# Patient Record
Sex: FEMALE | Race: BLACK OR AFRICAN AMERICAN | NOT HISPANIC OR LATINO | Employment: FULL TIME | URBAN - METROPOLITAN AREA
[De-identification: names, ages, dates, MRNs, and addresses within clinical notes are randomized per-mention and may not be internally consistent; named-entity substitution may affect disease eponyms.]

---

## 2023-05-22 LAB
HEPATITIS B VIRUS SURFACE AG PRESENCE IN SERUM: NONREACTIVE
HEPATITIS C VIRUS AB PRESENCE IN SERUM: NONREACTIVE
HIV 1/ 2 AG/AB SCREEN: NONREACTIVE
SYPHILIS TOTAL AB: NONREACTIVE

## 2023-05-24 LAB
CHLAMYDIA TRACH., AMPLIFIED: NEGATIVE
N. GONORRHEA, AMPLIFIED: NEGATIVE
TRICHOMONAS VAGINALIS: NEGATIVE

## 2024-01-16 ENCOUNTER — APPOINTMENT (OUTPATIENT)
Dept: RADIOLOGY | Facility: HOSPITAL | Age: 39
End: 2024-01-16
Payer: MEDICAID

## 2024-01-16 ENCOUNTER — HOSPITAL ENCOUNTER (EMERGENCY)
Facility: HOSPITAL | Age: 39
Discharge: HOME | End: 2024-01-16
Attending: EMERGENCY MEDICINE
Payer: MEDICAID

## 2024-01-16 VITALS
HEART RATE: 61 BPM | RESPIRATION RATE: 18 BRPM | OXYGEN SATURATION: 99 % | WEIGHT: 180 LBS | TEMPERATURE: 98.8 F | SYSTOLIC BLOOD PRESSURE: 166 MMHG | HEIGHT: 62 IN | DIASTOLIC BLOOD PRESSURE: 79 MMHG | BODY MASS INDEX: 33.13 KG/M2

## 2024-01-16 DIAGNOSIS — M54.50 ACUTE LOW BACK PAIN, UNSPECIFIED BACK PAIN LATERALITY, UNSPECIFIED WHETHER SCIATICA PRESENT: ICD-10-CM

## 2024-01-16 DIAGNOSIS — M47.816 FACET ARTHROPATHY, LUMBAR: ICD-10-CM
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
## 2024-01-16 LAB — HCG UR QL IA.RAPID: NEGATIVE

## 2024-01-16 PROCEDURE — 72100 X-RAY EXAM L-S SPINE 2/3 VWS: CPT

## 2024-01-16 PROCEDURE — 99283 EMERGENCY DEPT VISIT LOW MDM: CPT | Performed by: EMERGENCY MEDICINE

## 2024-01-16 PROCEDURE — 81025 URINE PREGNANCY TEST: CPT | Performed by: PHYSICIAN ASSISTANT

## 2024-01-16 PROCEDURE — 2500000001 HC RX 250 WO HCPCS SELF ADMINISTERED DRUGS (ALT 637 FOR MEDICARE OP): Performed by: EMERGENCY MEDICINE

## 2024-01-16 PROCEDURE — 2500000005 HC RX 250 GENERAL PHARMACY W/O HCPCS: Performed by: EMERGENCY MEDICINE

## 2024-01-16 RX ORDER — LIDOCAINE 560 MG/1
1 PATCH PERCUTANEOUS; TOPICAL; TRANSDERMAL ONCE
Status: DISCONTINUED | OUTPATIENT
Start: 2024-01-16 | End: 2024-01-16 | Stop reason: HOSPADM

## 2024-01-16 RX ORDER — METHYLPREDNISOLONE 4 MG/1
TABLET ORAL
Qty: 21 TABLET | Refills: 0 | Status: SHIPPED | OUTPATIENT
Start: 2024-01-16 | End: 2024-01-23

## 2024-01-16 RX ORDER — ACETAMINOPHEN 325 MG/1
650 TABLET ORAL EVERY 6 HOURS PRN
Qty: 30 TABLET | Refills: 0 | Status: SHIPPED | OUTPATIENT
Start: 2024-01-16 | End: 2024-01-26

## 2024-01-16 RX ORDER — ACETAMINOPHEN 325 MG/1
650 TABLET ORAL ONCE
Status: COMPLETED | OUTPATIENT
Start: 2024-01-16 | End: 2024-01-16

## 2024-01-16 RX ORDER — METHOCARBAMOL 500 MG/1
500 TABLET, FILM COATED ORAL 2 TIMES DAILY
Qty: 20 TABLET | Refills: 0 | Status: SHIPPED | OUTPATIENT
Start: 2024-01-16 | End: 2024-01-26

## 2024-01-16 RX ORDER — IBUPROFEN 600 MG/1
600 TABLET ORAL ONCE
Status: COMPLETED | OUTPATIENT
Start: 2024-01-16 | End: 2024-01-16

## 2024-01-16 RX ORDER — LIDOCAINE 50 MG/G
1 PATCH TOPICAL DAILY
Qty: 10 PATCH | Refills: 0 | Status: SHIPPED | OUTPATIENT
Start: 2024-01-16

## 2024-01-16 RX ADMIN — ACETAMINOPHEN 650 MG: 325 TABLET ORAL at 11:41

## 2024-01-16 RX ADMIN — LIDOCAINE 1 PATCH: 4 PATCH TOPICAL at 11:40

## 2024-01-16 RX ADMIN — IBUPROFEN 600 MG: 600 TABLET, FILM COATED ORAL at 11:41

## 2024-01-16 ASSESSMENT — PAIN DESCRIPTION - ORIENTATION
ORIENTATION: LOWER
ORIENTATION: MID;LOWER

## 2024-01-16 ASSESSMENT — PAIN - FUNCTIONAL ASSESSMENT
PAIN_FUNCTIONAL_ASSESSMENT: 0-10
PAIN_FUNCTIONAL_ASSESSMENT: 0-10

## 2024-01-16 ASSESSMENT — PAIN DESCRIPTION - LOCATION
LOCATION: BACK
LOCATION: BACK

## 2024-01-16 ASSESSMENT — PAIN SCALES - GENERAL
PAINLEVEL_OUTOF10: 5 - MODERATE PAIN
PAINLEVEL_OUTOF10: 8

## 2024-01-16 ASSESSMENT — COLUMBIA-SUICIDE SEVERITY RATING SCALE - C-SSRS
1. IN THE PAST MONTH, HAVE YOU WISHED YOU WERE DEAD OR WISHED YOU COULD GO TO SLEEP AND NOT WAKE UP?: NO
6. HAVE YOU EVER DONE ANYTHING, STARTED TO DO ANYTHING, OR PREPARED TO DO ANYTHING TO END YOUR LIFE?: NO
2. HAVE YOU ACTUALLY HAD ANY THOUGHTS OF KILLING YOURSELF?: NO

## 2024-01-16 ASSESSMENT — PAIN DESCRIPTION - PAIN TYPE: TYPE: ACUTE PAIN

## 2024-01-16 NOTE — PROGRESS NOTES
Attestation note/supervisory note for FRANCISCO Christopher      The patient is a 38-year-old female presenting to the emergency department for evaluation of low back pain that started after she fell 5 days ago.  She states that she was walking at work and slipped and fell backwards.  She states that she did injure her lower back.  She states that she has had some pain since the injury but it worsened today when she was driving to work.  No bowel or bladder incontinence.  No urinary retention.  No fever or chills.  No history of IV drug abuse.  No history of malignancy.  She states that she does not have any headache or visual changes.  No neck pain.  She does have lower back pain.  No specific better or worse other than when she moves.  No radiation.  It is a constant aching pain.  She denies any weakness or numbness.  No chest pain or shortness of breath.  No abdominal pain.  No nausea vomiting.  No diarrhea or constipation.  No urinary complaints.  The patient states that she does not want to file this is a Worker's Compensation claim.  All pertinent positives and negatives are recorded above.  All other systems reviewed and otherwise negative.  Vital signs with hypertension but otherwise within normal limits.  Physical exam with a well-nourished well-developed female in no acute distress.  HEENT exam within normal limits.  She has no evidence of airway compromise or respiratory distress.  Abdominal exam is benign.  She does have pain with palpation of the lumbar spine.  No step-offs.  She has no gross motor, neurologic or vascular deficits on exam.  Strength is 5 of 5 in all 4 strategies.  Sensation is intact.  Reflexes are intact.  She is able to walk and stand without difficulty.      Oral ibuprofen, Lidoderm and oral acetaminophen ordered.        Urine hCG negative      XR lumbar spine 2-3 views   Final Result   No acute osseous abnormality.   Mild degenerative changes with disc space narrowing of facet    osteoarthropathy at L5-S1.        MACRO:   None.        Signed by: Bessie Casey 1/16/2024 11:08 AM   Dictation workstation:   [id number]             The patient does not have any visible or palpable bony deformities on exam.  She does not have any neurologic deficits on exam.  Diagnostic imaging without fracture or dislocation.      The patient was released in good condition.  She was instructed to follow-up with her primary care physician within 1 to 2 days for further management of her current symptoms and repeat check of her blood pressure.  She will return to the emergency department sooner with worsening of symptoms or onset of new symptoms.  Rx given for naproxen and Robaxin.      Impression/diagnoses  Fall from standing height, initial encounter  Low back strain, acute   Hypertension, unspecified    I personally saw the patient and performed a substantive portion of the visit including all aspects of the medical decision making.  I personally saw the patient and made/approve the management plan and take responsibility for the patient.      I reviewed the results of the diagnostic labs and diagnostic imaging.  Formal radiology reading was completed by the radiologist      MD Katie Whitmore MD

## 2024-01-16 NOTE — Clinical Note
Kylee Harry was seen and treated in our emergency department on 1/16/2024.  She may return to work on 01/18/2024.       If you have any questions or concerns, please don't hesitate to call.      Katie Mortensen MD

## 2024-01-16 NOTE — ED PROVIDER NOTES
HPI   Chief Complaint   Patient presents with   • Fall       38-year-old female presented emergency department with a chief complaint of low back pain.  She had a fall approximately 5 days ago.  She has a history of prior back pain.  She denies bowel or bladder dysfunction, saddle anesthesia.  Ambulatory.  Not really been taking anything for relief.  Denies lightheadedness dizziness numbness weakness.  No other complaint.                            Meg Coma Scale Score: 15                  Patient History   Past Medical History:   Diagnosis Date   • Other specified health status     No pertinent past medical history     Past Surgical History:   Procedure Laterality Date   • OTHER SURGICAL HISTORY  2022     section   • OTHER SURGICAL HISTORY  2022    Breast reduction     No family history on file.  Social History     Tobacco Use   • Smoking status: Not on file   • Smokeless tobacco: Not on file   Substance Use Topics   • Alcohol use: Not on file   • Drug use: Not on file       Physical Exam   ED Triage Vitals [24 0819]   Temp Heart Rate Resp BP   36.2 °C (97.2 °F) 70 17 (!) 176/115      SpO2 Temp Source Heart Rate Source Patient Position   98 % Temporal -- --      BP Location FiO2 (%)     -- --       Physical Exam  Vitals and nursing note reviewed.   Constitutional:       Appearance: Normal appearance.   HENT:      Head: Normocephalic and atraumatic.      Nose: Nose normal.      Mouth/Throat:      Mouth: Mucous membranes are moist.   Cardiovascular:      Rate and Rhythm: Normal rate and regular rhythm.   Pulmonary:      Effort: Pulmonary effort is normal.      Breath sounds: Normal breath sounds.   Abdominal:      General: Abdomen is flat.   Musculoskeletal:         General: Normal range of motion.      Cervical back: Normal range of motion.      Comments: Paralumbar tenderness, no midline spinal tenderness, full range of motion of lower extremities bilaterally   Skin:     General: Skin  is warm and dry.   Neurological:      General: No focal deficit present.      Mental Status: She is alert and oriented to person, place, and time.   Psychiatric:         Mood and Affect: Mood normal.         ED Course & MDM   Diagnoses as of 01/16/24 1119   Fall, initial encounter   Acute low back pain, unspecified back pain laterality, unspecified whether sciatica present   Facet arthropathy, lumbar       Medical Decision Making  I have seen and evaluated this patient.  The attending physician has also seen and evaluated this patient.  Vital signs, laboratory testing and diagnostic images if applicable have been reviewed.  All laboratory and imaging is interpreted by myself unless otherwise stated.  Radiology studies are also formally interpreted by radiologist.    X-ray without acute fracture dislocation.  Does demonstrate degenerative changes.  Patient placed on anti-inflammatory medicine, steroid, muscle relaxer.  Released in good condition with primary follow-up.      Labs Reviewed   HCG, URINE, QUALITATIVE - Normal       Result Value    HCG, Urine NEGATIVE     POCT PREGNANCY, URINE     XR lumbar spine 2-3 views   Final Result   No acute osseous abnormality.   Mild degenerative changes with disc space narrowing of facet   osteoarthropathy at L5-S1.        MACRO:   None.        Signed by: Bessie Casey 1/16/2024 11:08 AM   Dictation workstation:   [id number]        Medications   acetaminophen (Tylenol) tablet 650 mg (has no administration in time range)   ibuprofen tablet 600 mg (has no administration in time range)   lidocaine 4 % patch 1 patch (has no administration in time range)     New Prescriptions    ACETAMINOPHEN (TYLENOL) 325 MG TABLET    Take 2 tablets (650 mg) by mouth every 6 hours if needed for mild pain (1 - 3) for up to 10 days.    LIDOCAINE (LIDODERM) 5 % PATCH    Place 1 patch over 12 hours on the skin once daily. Remove & discard patch within 12 hours or as directed by MD.    METHOCARBAMOL  (ROBAXIN) 500 MG TABLET    Take 1 tablet (500 mg) by mouth 2 times a day for 10 days.    METHYLPREDNISOLONE (MEDROL DOSPAK) 4 MG TABLETS    Follow schedule on package instructions                         Procedure  Procedures     Krishan Christopher PA-C  01/16/24 1119

## 2024-01-30 NOTE — PROGRESS NOTES
Physical Therapy  Physical Therapy Orthopedic Evaluation    Patient Name: Kylee Harry  MRN: [id number]  Today's Date: 1/31/2024  Time Calculation  Start Time: 0200  Stop Time: 0315  Time Calculation (min): 75 min    Insurance:  Visit number: 1 of   Authorization info: Auth req  Insurance Type: Payor: [employer] MARAL / Plan: [employer] PLAN / Product Type: *No Product type* /      Current Problem  1. Lumbar radiculopathy  Follow Up In Physical Therapy          Surgery:No    Referred by: Dr. Hedy Johnson    Precautions:     Medical History Form: Reviewed (scanned into chart)    Subjective:   Subjective   Chief Complaint: Low back pain  Onset: 2-3 weeks ago  BENIGNO: Worse over the course of a few years    General:She presented emergency department with a chief complaint of low back pain.  She had a fall approximately 5 days ago.  She has a history of prior back pain. If she does walk too long she gets pain down both legs. She denies bowel or bladder dysfunction, saddle anesthesia. Since last week neck worse than back pain.      Current Condition:   Better    Pain:     Location: Low back pain and radic   Rating: Best-2/3, Current-5, Worst-9  Description: sharp radiating pain down her legs.  Aggravating Factors:  standing and walking.   Relieving Factors:  sitting and laying down. Tylenol/ muscle relaxer, and lidocaine patch    Relevant Information (PMH & Previous Tests/Imaging): X-ray without acute fracture dislocation.  Does demonstrate degenerative changes. Mild degenerative changes with disc space narrowing of facet osteoarthropathy at L5-S1.     Previous Interventions/Treatments: None    Prior Level of Function (PLOF)  Patient previously independent with all ADLs  Exercise/Physical Activity: Video games  Work/School: - 8 miles    Patients Living Environment: Reviewed and no concern  Primary Language: English  Patient's Goal(s) for Therapy: Improved low back pain and  "ability to return to walking without restriction    Red Flags: Do you have any of the following? No  Fever/chills, unexplained weight changes, dizziness/fainting, unexplained change in bowel or bladder functions, unexplained malaise or muscle weakness, night pain/sweats, numbness or tingling    Objective:  Objective     Palpation/Observation  Central PA glides of the lumbar spine exhibit pain at L4 and L5, no joint restriction noted  Posture  WNL without accentuated curvatures.  Pelvic landmarks are symmetrical  Special Testing  Positive slump test, positive straight leg, positive sacral rock  ROM  1/31/2024  Lumbar  Flexion 50% limited with bilateral radicular symptoms  Extension within normal limits and relieve pain  Right and left rotation are both within normal limits without pain    Hamstring flexibility popliteal angle 40 degrees bilaterally  Hip internal/external rotation within normal limits at the logroll position however is 10 to 15 degrees limited in the 90/90 position    Positive Mike test bilaterally,  Strength  1/31/2024  L>R knee ext  Sensation  Patel L4 and L5 Radic  Reflexes  1+ achilles and patellar seated    Supine hamstring flexibility stretch  Prone press ups      Outcome Measures:  Other Measures  Oswestry Disablity Index (KRISTINA): 22%   1/31/2024  Treatments:  Ther-EX:      Exercises  - Static Prone on Elbows  - 1 x daily - 7 x weekly - 1 sets - 15 reps - 10\" hold  - Supine Hamstring Stretch  - 1 x daily - 7 x weekly - 1 sets - 3 reps - 30\" hold  - Supine Bridge  - 1 x daily - 7 x weekly - 1 sets - 15 reps - 10\" hold  - Half Kneeling Hip Flexor Stretch  - 1 x daily - 7 x weekly - 1 sets - 3 reps - 30\" hold  There- ACT:  NP  Neuro:  NP  Manual:  Utilization of bilateral lower extremity long axis distraction, utilization of belt for improved grade 1-2 mobilization 10 minutes total  Modalities:  declined        EDUCATION: Home exercise program, plan of care, activity modifications, pain management, " and injury pathology     Code: Access Code: [email or web address]  URL: [email or web address]  Date: 01/31/2024  Prepared by: Sergio Cantrell    Assessment: Patient presents with signs and symptoms consistent with Low back pain, resulting in limited participation in pain-free ADLs and inability to perform at their prior level of function. Pt would benefit from physical therapy to address the impairments found & listed previously in the objective section in order to return to safe and pain-free ADLs and prior level of function.     Prognosis: Good however this is a longstanding issue  Response to care: No increased pain with treatment    Plan:     Planned Interventions include: therapeutic exercise, self-care home management, manual therapy, therapeutic activities, gait training, neuromuscular coordination, vasopneumatic, dry needling, aquatic therapy    Next Treatment: She may benefit from extension-based treatment including bird dogs, warm up on the bicycle, utilization of Central PA glides in a prone press up position for centralization, utilization of traction and/or interferential stim to decrease symptoms  Frequency: 1-2 x Week  Duration: 6 Weeks  Goals: Set and discussed today         Active       PT Problem       PT Goal 1       Start:  01/31/24    Expected End:  03/13/24       1.Patient to be independent with HEP for progressions and carryover    2. Patient to report pain less than  5/10 for return to walking at work    3. Patient to have improved lumbar flexion to assist with returning to picking up 10-20 lbs for work    4. Improved KRISTINA by 10% outcome measure for improved pain and function with daily activities    5. Patient to report decreased radic by 50% freq and intensity for imrpoved standing tolerance                Plan of care was developed with input and agreement by the patient      Sergio Cantrell, PT

## 2024-01-31 ENCOUNTER — EVALUATION (OUTPATIENT)
Dept: PHYSICAL THERAPY | Facility: CLINIC | Age: 39
End: 2024-01-31
Payer: MEDICAID

## 2024-01-31 DIAGNOSIS — M54.16 LUMBAR RADICULOPATHY: Primary | ICD-10-CM

## 2024-01-31 PROBLEM — M54.50 LOW BACK PAIN: Status: ACTIVE | Noted: 2024-01-31

## 2024-01-31 PROCEDURE — 97140 MANUAL THERAPY 1/> REGIONS: CPT | Mod: GP | Performed by: PHYSICAL THERAPIST

## 2024-01-31 PROCEDURE — 97161 PT EVAL LOW COMPLEX 20 MIN: CPT | Mod: GP | Performed by: PHYSICAL THERAPIST

## 2024-01-31 PROCEDURE — 97110 THERAPEUTIC EXERCISES: CPT | Mod: GP | Performed by: PHYSICAL THERAPIST

## 2024-01-31 ASSESSMENT — ENCOUNTER SYMPTOMS
DEPRESSION: 0
OCCASIONAL FEELINGS OF UNSTEADINESS: 0
LOSS OF SENSATION IN FEET: 0

## 2024-02-02 ENCOUNTER — APPOINTMENT (OUTPATIENT)
Dept: RADIOLOGY | Facility: HOSPITAL | Age: 39
End: 2024-02-02
Payer: MEDICAID

## 2024-02-11 ENCOUNTER — APPOINTMENT (OUTPATIENT)
Dept: RADIOLOGY | Facility: HOSPITAL | Age: 39
End: 2024-02-11
Payer: MEDICAID

## 2024-03-03 ENCOUNTER — APPOINTMENT (OUTPATIENT)
Dept: RADIOLOGY | Facility: HOSPITAL | Age: 39
End: 2024-03-03
Payer: MEDICAID

## 2024-04-07 ENCOUNTER — APPOINTMENT (OUTPATIENT)
Dept: RADIOLOGY | Facility: HOSPITAL | Age: 39
End: 2024-04-07
Payer: MEDICAID

## 2024-04-26 ENCOUNTER — DOCUMENTATION (OUTPATIENT)
Dept: PHYSICAL THERAPY | Facility: CLINIC | Age: 39
End: 2024-04-26
Payer: MEDICAID

## 2024-04-26 PROBLEM — M54.50 LOW BACK PAIN: Status: RESOLVED | Noted: 2024-01-31 | Resolved: 2024-04-26

## 2024-05-10 NOTE — ED NOTES
Updated patient vital in waiting room, asked if she needed anything. Input vital in system. Patient stated she is only in pain when moving. After vital, assisted patient back to the waiting room.     Marii Adrian, EMT  01/16/24 1016    
None

## 2024-06-17 ENCOUNTER — APPOINTMENT (OUTPATIENT)
Dept: OBSTETRICS AND GYNECOLOGY | Facility: CLINIC | Age: 39
End: 2024-06-17
Payer: MEDICAID

## 2024-06-17 VITALS
WEIGHT: 190 LBS | DIASTOLIC BLOOD PRESSURE: 86 MMHG | BODY MASS INDEX: 34.96 KG/M2 | HEIGHT: 62 IN | SYSTOLIC BLOOD PRESSURE: 130 MMHG

## 2024-06-17 DIAGNOSIS — N92.6 IRREGULAR PERIODS: Primary | ICD-10-CM

## 2024-06-17 PROCEDURE — 1036F TOBACCO NON-USER: CPT | Performed by: ADVANCED PRACTICE MIDWIFE

## 2024-06-17 PROCEDURE — 99395 PREV VISIT EST AGE 18-39: CPT | Performed by: ADVANCED PRACTICE MIDWIFE

## 2024-06-17 RX ORDER — NORETHINDRONE ACETATE AND ETHINYL ESTRADIOL 1.5-30(21)
1 KIT ORAL DAILY
Qty: 28 TABLET | Refills: 11 | Status: SHIPPED | OUTPATIENT
Start: 2024-06-17

## 2024-06-17 NOTE — PROGRESS NOTES
"Assessment/Plan   Problem List Items Addressed This Visit    None  Visit Diagnoses         Codes    Irregular periods    -  Primary N92.6    Relevant Medications    norethindrone-e.estradioL-iron (Microgestin FE 1.5/30) 1.5 mg-30 mcg (21)/75 mg (7) tablet            CATALINA Carr-BÁRBARA Rutherford   Kylee Harry is a 38 y.o. female who is here for a routine exam.     Concerns today:  Lump in left breast    Patient's last menstrual period was 2024 (exact date).   Periods are  every 2.5 weeks , lasting 5 days.   Dysmenorrhea:mild, occurring first 1-2 days of flow.   Cyclic symptoms include bloating and nausea .     Sexual Activity: sexually active, male partners; Patient reports 1 partners in the last 12 months.  Pain with intercourse? No   Loss of desire? No   Able to have an orgasm? Yes     History of prior STI: gonorrhea and chlamydia    Current contraception: none    Last pap:   History of abnormal Pap smear: yes -   Family history of uterine or ovarian cancer: no    Last mammogram: NA    Menstrual History:  OB History          3    Para   2    Term   2            AB   1    Living             SAB        IAB   1    Ectopic        Multiple        Live Births                    Menarche age: 12  Patient's last menstrual period was 2024 (exact date).       Objective   /86   Ht 1.575 m (5' 2\")   Wt 86.2 kg (190 lb)   LMP 2024 (Exact Date)   BMI 34.75 kg/m²   Physical Exam  Constitutional:       Appearance: Normal appearance.   Genitourinary:      Vulva and rectum normal.   HENT:      Head: Normocephalic.      Nose: Nose normal.      Mouth/Throat:      Mouth: Mucous membranes are moist.   Cardiovascular:      Rate and Rhythm: Normal rate.   Pulmonary:      Effort: Pulmonary effort is normal.   Abdominal:      General: Abdomen is flat.   Musculoskeletal:         General: Normal range of motion.      Cervical back: Normal range of motion and neck supple. "   Neurological:      General: No focal deficit present.      Mental Status: She is alert and oriented to person, place, and time.   Skin:     General: Skin is warm.   Psychiatric:         Mood and Affect: Mood normal.         Behavior: Behavior normal.   Vitals reviewed.           Loestrin for irregular periods

## 2024-07-07 ENCOUNTER — HOSPITAL ENCOUNTER (EMERGENCY)
Facility: HOSPITAL | Age: 39
Discharge: HOME | End: 2024-07-07
Attending: STUDENT IN AN ORGANIZED HEALTH CARE EDUCATION/TRAINING PROGRAM
Payer: MEDICAID

## 2024-07-07 VITALS
TEMPERATURE: 97.7 F | WEIGHT: 180 LBS | HEART RATE: 82 BPM | BODY MASS INDEX: 33.13 KG/M2 | DIASTOLIC BLOOD PRESSURE: 100 MMHG | OXYGEN SATURATION: 97 % | HEIGHT: 62 IN | RESPIRATION RATE: 15 BRPM | SYSTOLIC BLOOD PRESSURE: 156 MMHG

## 2024-07-07 DIAGNOSIS — U07.1 COVID-19: Primary | ICD-10-CM

## 2024-07-07 LAB
FLUAV RNA RESP QL NAA+PROBE: NOT DETECTED
FLUBV RNA RESP QL NAA+PROBE: NOT DETECTED
S PYO DNA THROAT QL NAA+PROBE: NOT DETECTED
SARS-COV-2 RNA RESP QL NAA+PROBE: DETECTED

## 2024-07-07 PROCEDURE — 96374 THER/PROPH/DIAG INJ IV PUSH: CPT

## 2024-07-07 PROCEDURE — 96361 HYDRATE IV INFUSION ADD-ON: CPT

## 2024-07-07 PROCEDURE — 99284 EMERGENCY DEPT VISIT MOD MDM: CPT

## 2024-07-07 PROCEDURE — 2500000004 HC RX 250 GENERAL PHARMACY W/ HCPCS (ALT 636 FOR OP/ED): Performed by: STUDENT IN AN ORGANIZED HEALTH CARE EDUCATION/TRAINING PROGRAM

## 2024-07-07 PROCEDURE — 87651 STREP A DNA AMP PROBE: CPT | Performed by: STUDENT IN AN ORGANIZED HEALTH CARE EDUCATION/TRAINING PROGRAM

## 2024-07-07 PROCEDURE — 87502 INFLUENZA DNA AMP PROBE: CPT | Performed by: STUDENT IN AN ORGANIZED HEALTH CARE EDUCATION/TRAINING PROGRAM

## 2024-07-07 PROCEDURE — 96375 TX/PRO/DX INJ NEW DRUG ADDON: CPT

## 2024-07-07 RX ORDER — DIPHENHYDRAMINE HYDROCHLORIDE 50 MG/ML
25 INJECTION INTRAMUSCULAR; INTRAVENOUS ONCE
Status: COMPLETED | OUTPATIENT
Start: 2024-07-07 | End: 2024-07-07

## 2024-07-07 RX ORDER — PROCHLORPERAZINE EDISYLATE 5 MG/ML
5 INJECTION INTRAMUSCULAR; INTRAVENOUS ONCE
Status: COMPLETED | OUTPATIENT
Start: 2024-07-07 | End: 2024-07-07

## 2024-07-07 NOTE — DISCHARGE INSTRUCTIONS
You were seen in the Emergency Department today for viral-like symptoms.  At this time you are positive for COVID-19.  I have prescribed you a medication to use for COVID symptoms called Paxlovid.  I have also provided you with information regarding this medication.  I expect you to feel better in several days but if you have any other concerns, you can return to emergency department for reevaluation.

## 2024-07-07 NOTE — Clinical Note
Kylee Harry was seen and treated in our emergency department on 7/7/2024.  She may return to work on 07/09/2024.       If you have any questions or concerns, please don't hesitate to call.      Cindy Rebolledo MD

## 2024-07-10 NOTE — ED PROVIDER NOTES
HPI   Chief Complaint   Patient presents with    Flu Symptoms     Pt arrived to ed from home with complaints of flu like symptoms. Pt states she had a fever of 101 pt state she took tylenol and the fever came down. Pt states she also has pain when she swallows, and has a head ache. Pt is denies chest pain and sob resp even and unlabored. Pt is alert and oriented x 4.        38 year old female presents with flu like symptoms.  Notes her boyfriend was recently sick, and she developed the same symptoms he had starting yesterday.  Notes sore throat, congestion, body aches.  Had a fever at home, treated with tylenol.  Associated headache.                           No data recorded                   Patient History   Past Medical History:   Diagnosis Date    Other specified health status     No pertinent past medical history     Past Surgical History:   Procedure Laterality Date    OTHER SURGICAL HISTORY  2022     section    OTHER SURGICAL HISTORY  2022    Breast reduction     Family History   Problem Relation Name Age of Onset    Other (hypoplastic heart) Daughter      Diabetes Maternal Grandmother      Hypertension Maternal Grandmother       Social History     Tobacco Use    Smoking status: Never    Smokeless tobacco: Never   Substance Use Topics    Alcohol use: Not Currently    Drug use: Not on file       Physical Exam   ED Triage Vitals [24 1613]   Temperature Heart Rate Respirations BP   36.5 °C (97.7 °F) 90 16 (!) 171/112      Pulse Ox Temp Source Heart Rate Source Patient Position   99 % Tympanic Monitor Sitting      BP Location FiO2 (%)     Right arm --       Physical Exam  Vitals and nursing note reviewed.   Constitutional:       General: She is not in acute distress.     Appearance: She is not ill-appearing.   HENT:      Head: Normocephalic and atraumatic.      Mouth/Throat:      Mouth: Mucous membranes are moist.      Pharynx: Oropharynx is clear. Uvula midline. Posterior oropharyngeal  erythema present. No oropharyngeal exudate.   Eyes:      Extraocular Movements: Extraocular movements intact.      Conjunctiva/sclera: Conjunctivae normal.      Pupils: Pupils are equal, round, and reactive to light.   Cardiovascular:      Rate and Rhythm: Normal rate and regular rhythm.   Pulmonary:      Effort: Pulmonary effort is normal. No respiratory distress.      Breath sounds: Normal breath sounds.   Abdominal:      General: There is no distension.      Palpations: Abdomen is soft.      Tenderness: There is no abdominal tenderness.   Musculoskeletal:         General: No swelling or deformity. Normal range of motion.      Cervical back: Normal range of motion and neck supple.   Skin:     General: Skin is warm and dry.      Capillary Refill: Capillary refill takes less than 2 seconds.   Neurological:      General: No focal deficit present.      Mental Status: She is alert and oriented to person, place, and time. Mental status is at baseline.   Psychiatric:         Mood and Affect: Mood normal.         Behavior: Behavior normal.         ED Course & [facility]   ED Course as of 07/09/24 2341   Sun Jul 07, 2024 1824 Coronavirus 2019, PCR(!): Detected [JM]      ED Course User Index  [JM] Cindy Rebolledo MD         Diagnoses as of 07/09/24 2341   COVID-19       Medical Decision Making  38 y.o. female presents with flu like symptoms, most likely d/t acute viral syndrome given laboratory & historical and physical exam features.  I have considered the following conditions in my assessment of this patient's cough: Bronchitis, pneumonia, bronchospasm/asthma, croup, pertussis, COPD, irritant cough (ie.  post-nasal drip, fume inhalation, allergens), medication side effect (ie ACE inhibitors), viral upper respiratory infection, influenza.  Lung examination reassuring, no concern for focal process such as pneumonia.  Patient positive for COVID19.  The patient responded to treatment with improvement in symptoms.  Pt is  well-appearing after supportive measures, no peritoneal or meningeal signs.  At this time is stable, with normal vitals.  No further indication for urgent/emergent workup or management and is appropriate for d/c home.  F/up PMD.        Procedure  Procedures     Cindy Rebolledo MD  07/09/24 [id number]

## 2024-08-09 ENCOUNTER — HOSPITAL ENCOUNTER (EMERGENCY)
Facility: HOSPITAL | Age: 39
Discharge: HOME | End: 2024-08-09
Payer: MEDICAID

## 2024-08-09 VITALS
OXYGEN SATURATION: 97 % | SYSTOLIC BLOOD PRESSURE: 149 MMHG | WEIGHT: 185 LBS | RESPIRATION RATE: 18 BRPM | HEIGHT: 62 IN | DIASTOLIC BLOOD PRESSURE: 83 MMHG | HEART RATE: 81 BPM | TEMPERATURE: 96.4 F | BODY MASS INDEX: 34.04 KG/M2

## 2024-08-09 DIAGNOSIS — M25.522 LEFT ELBOW PAIN: Primary | ICD-10-CM

## 2024-08-09 PROCEDURE — 2500000004 HC RX 250 GENERAL PHARMACY W/ HCPCS (ALT 636 FOR OP/ED): Performed by: NURSE PRACTITIONER

## 2024-08-09 PROCEDURE — 96372 THER/PROPH/DIAG INJ SC/IM: CPT | Performed by: NURSE PRACTITIONER

## 2024-08-09 PROCEDURE — 2500000002 HC RX 250 W HCPCS SELF ADMINISTERED DRUGS (ALT 637 FOR MEDICARE OP, ALT 636 FOR OP/ED): Performed by: NURSE PRACTITIONER

## 2024-08-09 PROCEDURE — 99283 EMERGENCY DEPT VISIT LOW MDM: CPT

## 2024-08-09 RX ORDER — PREDNISONE 20 MG/1
20 TABLET ORAL DAILY
Qty: 5 TABLET | Refills: 0 | Status: SHIPPED | OUTPATIENT
Start: 2024-08-09 | End: 2024-08-14

## 2024-08-09 RX ORDER — KETOROLAC TROMETHAMINE 30 MG/ML
30 INJECTION, SOLUTION INTRAMUSCULAR; INTRAVENOUS ONCE
Status: COMPLETED | OUTPATIENT
Start: 2024-08-09 | End: 2024-08-09

## 2024-08-09 RX ORDER — TIZANIDINE 4 MG/1
4 TABLET ORAL ONCE
Status: COMPLETED | OUTPATIENT
Start: 2024-08-09 | End: 2024-08-09

## 2024-08-09 RX ORDER — DICLOFENAC SODIUM 10 MG/G
4 GEL TOPICAL 4 TIMES DAILY
Qty: 20 G | Refills: 0 | Status: SHIPPED | OUTPATIENT
Start: 2024-08-09

## 2024-08-09 RX ORDER — TIZANIDINE 2 MG/1
4 TABLET ORAL 2 TIMES DAILY
Qty: 56 TABLET | Refills: 0 | Status: SHIPPED | OUTPATIENT
Start: 2024-08-09 | End: 2024-08-23

## 2024-08-09 RX ORDER — PREDNISONE 20 MG/1
20 TABLET ORAL ONCE
Status: COMPLETED | OUTPATIENT
Start: 2024-08-09 | End: 2024-08-09

## 2024-08-09 ASSESSMENT — COLUMBIA-SUICIDE SEVERITY RATING SCALE - C-SSRS
2. HAVE YOU ACTUALLY HAD ANY THOUGHTS OF KILLING YOURSELF?: NO
6. HAVE YOU EVER DONE ANYTHING, STARTED TO DO ANYTHING, OR PREPARED TO DO ANYTHING TO END YOUR LIFE?: NO
1. IN THE PAST MONTH, HAVE YOU WISHED YOU WERE DEAD OR WISHED YOU COULD GO TO SLEEP AND NOT WAKE UP?: NO

## 2024-08-09 ASSESSMENT — PAIN DESCRIPTION - LOCATION: LOCATION: ELBOW

## 2024-08-09 ASSESSMENT — PAIN SCALES - GENERAL
PAINLEVEL_OUTOF10: 7
PAINLEVEL_OUTOF10: 7

## 2024-08-09 ASSESSMENT — PAIN DESCRIPTION - ORIENTATION: ORIENTATION: LEFT

## 2024-08-09 ASSESSMENT — PAIN - FUNCTIONAL ASSESSMENT: PAIN_FUNCTIONAL_ASSESSMENT: 0-10

## 2024-08-09 NOTE — Clinical Note
Kylee Harry was seen and treated in our emergency department on 8/9/2024.  She may return to work on 08/13/2024.       If you have any questions or concerns, please don't hesitate to call.      Mathieu Henson, CATALINA-CNP

## 2024-08-09 NOTE — ED PROVIDER NOTES
HPI   Chief Complaint   Patient presents with    Elbow Pain       38-year-old female presents today with left elbow pain.  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  She is endorsing tingling at the elbow but denies any change in skin temperature or color.  She denies any recent trauma or fall.  She has had prior history of this elbow pain in the past.  She can supinate and pronate without difficulty and she is not on any anticoagulant medication.  She denies pregnancy.  She has no other cause for concern or complaint.  She denies chest pain, abdominal pain, nausea, or vomiting.  She has used an elbow brace and ibuprofen which gave little relief.      History provided by:  Patient   used: No            Patient History   Past Medical History:   Diagnosis Date    Other specified health status     No pertinent past medical history     Past Surgical History:   Procedure Laterality Date    OTHER SURGICAL HISTORY  2022     section    OTHER SURGICAL HISTORY  2022    Breast reduction     Family History   Problem Relation Name Age of Onset    Other (hypoplastic heart) Daughter      Diabetes Maternal Grandmother      Hypertension Maternal Grandmother       Social History     Tobacco Use    Smoking status: Never    Smokeless tobacco: Never   Substance Use Topics    Alcohol use: Not Currently    Drug use: Not on file       Physical Exam   ED Triage Vitals [24 0920]   Temperature Heart Rate Respirations BP   35.8 °C (96.4 °F) 84 16 --      Pulse Ox Temp Source Heart Rate Source Patient Position   100 % Temporal -- --      BP Location FiO2 (%)     -- --       Physical Exam  Constitutional:       Appearance: Normal appearance.   HENT:      Head: Normocephalic and atraumatic.      Nose: Nose normal.      Mouth/Throat:      Mouth: Mucous membranes are moist.   Eyes:      Pupils: Pupils are equal, round, and reactive to light.   Cardiovascular:       Rate and Rhythm: Normal rate and regular rhythm.      Pulses: Normal pulses.      Heart sounds: Normal heart sounds.   Pulmonary:      Effort: Pulmonary effort is normal.      Breath sounds: Normal breath sounds.   Abdominal:      General: Abdomen is flat.   Musculoskeletal:         General: Tenderness present. Normal range of motion.      Cervical back: Normal range of motion.      Comments: She can supinate and pronate without difficulty.  There was no change in skin temperature or color.  Radial pulse 2/2 cap refill less than 2 seconds.   Skin:     General: Skin is warm.      Capillary Refill: Capillary refill takes less than 2 seconds.      Coloration: Skin is not pale.      Findings: No bruising, erythema, lesion or rash.   Neurological:      General: No focal deficit present.      Mental Status: She is alert and oriented to person, place, and time.   Psychiatric:         Mood and Affect: Mood normal.         Behavior: Behavior normal.           ED Course & MDM   Diagnoses as of 08/09/24 0957   Left elbow pain                 No data recorded     [place] Coma Scale Score: 15 (08/09/24 0920 : Ml Isaac RN)                           Medical Decision Making  Patient was able to supinate and pronate without difficulty she can  my hand easily.  When she  my hand she felt some pain in her elbow.  Radial pulse was 2/2 cap refill less than 2 seconds.  I started patient on Toradol and tizanidine.  She went home on prednisone, dull Flanax cream, and she can use Motrin and Tylenol interchangeably.  I requested appointment with orthopedic on-call surgery.  Patient understands to stop at the  for appointment.  She received a work note until Tuesday.        Procedure  Procedures     Mathieu Henson, CATALINA-CNP  08/09/24 0940

## 2024-08-09 NOTE — ED TRIAGE NOTES
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

## 2024-08-12 ENCOUNTER — HOSPITAL ENCOUNTER (OUTPATIENT)
Dept: RADIOLOGY | Facility: HOSPITAL | Age: 39
Discharge: HOME | End: 2024-08-12
Payer: MEDICAID

## 2024-08-12 ENCOUNTER — OFFICE VISIT (OUTPATIENT)
Dept: ORTHOPEDIC SURGERY | Facility: HOSPITAL | Age: 39
End: 2024-08-12
Payer: MEDICAID

## 2024-08-12 VITALS — BODY MASS INDEX: 34.04 KG/M2 | HEIGHT: 62 IN | WEIGHT: 185 LBS

## 2024-08-12 DIAGNOSIS — M25.522 LEFT ELBOW PAIN: ICD-10-CM

## 2024-08-12 DIAGNOSIS — M77.12 LATERAL EPICONDYLITIS OF LEFT ELBOW: Primary | ICD-10-CM

## 2024-08-12 PROCEDURE — 3008F BODY MASS INDEX DOCD: CPT | Performed by: SPECIALIST/TECHNOLOGIST

## 2024-08-12 PROCEDURE — 99204 OFFICE O/P NEW MOD 45 MIN: CPT | Performed by: SPECIALIST/TECHNOLOGIST

## 2024-08-12 PROCEDURE — 99214 OFFICE O/P EST MOD 30 MIN: CPT | Performed by: SPECIALIST/TECHNOLOGIST

## 2024-08-12 PROCEDURE — 73080 X-RAY EXAM OF ELBOW: CPT | Mod: LT

## 2024-08-12 PROCEDURE — 1036F TOBACCO NON-USER: CPT | Performed by: SPECIALIST/TECHNOLOGIST

## 2024-08-12 PROCEDURE — 73080 X-RAY EXAM OF ELBOW: CPT | Mod: LEFT SIDE | Performed by: RADIOLOGY

## 2024-08-12 PROCEDURE — L3908 WHO COCK-UP NONMOLDE PRE OTS: HCPCS | Performed by: SPECIALIST/TECHNOLOGIST

## 2024-08-12 RX ORDER — NAPROXEN 500 MG/1
500 TABLET ORAL 2 TIMES DAILY
Qty: 28 TABLET | Refills: 0 | Status: SHIPPED | OUTPATIENT
Start: 2024-08-12 | End: 2024-08-26

## 2024-08-12 ASSESSMENT — PAIN DESCRIPTION - DESCRIPTORS: DESCRIPTORS: SHARP;TINGLING

## 2024-08-12 ASSESSMENT — PAIN SCALES - GENERAL: PAINLEVEL_OUTOF10: 5 - MODERATE PAIN

## 2024-08-12 ASSESSMENT — PAIN - FUNCTIONAL ASSESSMENT: PAIN_FUNCTIONAL_ASSESSMENT: 0-10

## 2024-08-12 NOTE — PROGRESS NOTES
Chief Complaint: Pain of the Left Elbow       HPI:  Kylee Harry is a 38 y.o. right-hand-dominant female presenting today with approximately 3 weeks of left elbow pain of unknown mechanism.  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  She was seen previously in the emergency department where she was provided with Toradol.  She has been using ibuprofen and an elbow brace that she obtain over-the-counter herself.  Denies prior left elbow injuries.  Denies numbness or tingling in the left upper extremity.  Presents for treatment recommendations.    Objective     ROS:  Constitutional: No fever, no chills, not feeling tired, no recent weight gain and no recent weight loss  ENT: No nosebleeds  Cardiovascular: No chest pain  Respiratory: No shortness of breath and no cough  Gastrointestinal: No abdominal pain, no nausea, no diarrhea, and no vomiting  Musculoskeletal: Positive for left elbow pain  Integumentary: No rashes and no skin lesions  Neurological: No headache  Psychiatric: No sleep disturbances no depression  Endocrine: No muscle weakness and no muscle cramps  Hematologic/lymphatic: No swelling glands and no tendency for easy bruising    Past Medical History:   Diagnosis Date    Other specified health status     No pertinent past medical history        Past Surgical History:   Procedure Laterality Date    OTHER SURGICAL HISTORY  2022     section    OTHER SURGICAL HISTORY  2022    Breast reduction        Social Connections: Not on file          Physical Exam:  General appearance: WN, WD female, in no acute distress  Skin: No rashes, lesions or wounds  Head: Normocephalic, no evidence of trauma  Eye: EOMI, conjunctiva clear, no discharge  ENT: Nares patent  Neck: No abnormal contour, tracheal midline  Chest/lungs: No respiratory distress, speaking in complete  sentences  Musculoskeletal: Tender to palpation over the lateral humeral condyle, common extensor tendon.  Stable valgus and varus stress test at the elbow.  Full, symmetric and pain-free elbow range of motion bilaterally.  4+/5 wrist extension left versus right secondary to pain.  5/5  strength bilaterally.  2+ radial pulses bilaterally.  No decreased ROM, muscle wasting, rigidity    Neurological: A&O x3, no focal deficits, intact bilateral UE  Psych: normal affect, mood, appearance      Image Results:  X-rays taken on 8/12/2024 reviewed with the patient in the office today and reveal no acute fractures or dislocations.      Assessment/Plan   Encounter Diagnoses:  Left elbow pain    Lateral epicondylitis of left elbow    Orders Placed This Encounter    Wrist Cock UP Splint (xs only)    XR elbow left 3+ views    Referral to Occupational Therapy       The patient and I discussed her clinical presentation and physical exam findings consistent with left elbow lateral epicondylitis.  We agreed upon conservative management at this time.  We agreed upon a cock up wrist brace to be worn at all times to help provide the tendon some periods of rest.  She may take this off to shower.  She may take this off to wash her hands.  I advised her to sleep in the brace.  She may continue with the elbow brace if it is providing her with adequate relief of her symptoms.  She was provided with a referral to Occupational Therapy.  Also, we discussed the use of naproxen 500 mg taken twice daily for the next 2 weeks.  We discussed the use of a corticosteroid injection into the lateral elbow which we will  defer until later.  She will follow-up in the next 6-8 weeks if her symptoms persist or worsen.  She is in agreement this plan.  All of her questions have been answered.    Patient was prescribed a cock up wrist brace for left elbow lateral epicondylitis. The patient has weakness, instability and/or deformity of their left elbow which  requires stabilization from this orthosis to improve their function.      Verbal and written instructions for the use, wear schedule, cleaning and application of this item were given.  Patient was instructed that should the brace result in increased pain, decreased sensation, increased swelling, or an overall worsening of their medical condition, to please contact our office immediately.     Orthotic management and training was provided for skin care, modifications due to healing tissues, edema changes, interruption in skin integrity, and safety precautions with the orthosis.      ** This office note was dictated using Dragon voice to text software and was not proofread for spelling or grammatical errors **

## 2024-08-19 ENCOUNTER — EVALUATION (OUTPATIENT)
Dept: OCCUPATIONAL THERAPY | Facility: CLINIC | Age: 39
End: 2024-08-19
Payer: MEDICAID

## 2024-08-19 DIAGNOSIS — M77.12 LATERAL EPICONDYLITIS OF LEFT ELBOW: ICD-10-CM

## 2024-08-19 PROCEDURE — 97165 OT EVAL LOW COMPLEX 30 MIN: CPT | Mod: GO | Performed by: OCCUPATIONAL THERAPIST

## 2024-08-19 NOTE — PROGRESS NOTES
"    Occupational Therapy Orthopedic Evaluation    Patient Name: Kylee Harry  MRN: [id number]  Today's Date: 8/20/2024  Time Calculation  Start Time: 0400  Stop Time: 0430  Time Calculation (min): 30 min    Insurance:  Visit number: 1 of 16  Authorization info: required  Insurance Type: [facility] community plan  Authorization certification  -  Start: 8/19/24  End: 11/11/24    Current Problem  1. Lateral epicondylitis of left elbow  Referral to Occupational Therapy    Follow Up In Occupational Therapy    Follow Up In Occupational Therapy          Referred by:  Dr. Marcial  Referred for: L tennis elbow  Onset/DOI:  pt reports, about 2 months ago     Fall risk: none  Precautions: none     Medical History Form: Reviewed (scanned into chart)    Subjective   Chief Complaint: L lateral elbow pain with insidious onset    Hand Dominance: Right    Pain:  2-7/10  Location: L lateral epicondyle  Description: aching, sharp, tingling  Aggravating Factors:  sleep, work tasks as   Relieving Factors: \"not bending my arm so much\" and ice    Previous Interventions/Treatments: None    Prior Level of Function (PLOF)  Patient previously independent with all ADLs/IADLs    ADL/IADL impairments  Bathing, Dressing, Hair care, Sleep, Home mgt, and Work    Patients Living Environment: Reviewed and no concern  Lives with: 17 year old son  Primary Language: English  Patient's Goal(s) for Therapy:  \"make the pain go away\"    Red Flags: Do you have any of the following? No  Fever/chills, unexplained weight changes, dizziness/fainting, unexplained change in bowel or bladder functions, unexplained malaise or muscle weakness, night pain/sweats, numbness or tingling    Objective     +TTP L lateral epicondyle  HAND STRENGTH (Lbs)   R L   Dynamometer  90lb 60lb       Outcome Measures:         QUICK DASH:      Home Program:  Exercise Sets/Reps Comments   Isometrics wrist extension hold 30/60     Eccentric stretching     Heat modality     Activity " modification                                     Treatment Today  - OT roxanna completed      EDUCATION: Home exercise program, plan of care, activity modifications, joint protection, pain management, and injury pathology       Assessment:   Pt is 38 year old female presenting with L lateral elbow pain with insidious onset two months ago with complaints of decreased strength and increased pain.  As a result of these impairments pt is having difficulty with upper body/lower body bathing and dressing tasks, difficulty with hair care tasks, is experiencing loss of sleep, and is having difficulty with home management and meal prep and work tasks.  Without skilled intervention patient is at risk for further loss of ROM, loss of strength, reduced ADL function, and is at risk for requiring caregiver assistance for self care completion.  Patient to benefit from skilled services to address the impairments found in order to return to independent prior level of function.      Level of Complexity  LOW complexity    OT Rehab Potential  Excellent      Plan:     Planned Interventions include: therapeutic exercise, self-care home management, manual therapy, therapeutic activities, , neuromuscular coordination, vasopneumatic, dry needling, and orthosis fabrication.  Frequency: 1-2 x Week  Duration: 6 Weeks    Goals: Set and discussed today         1) Patient will be able to return to all work tasks with no more than 2/10 left elbow pain, in six weeks.         2) Patient will demonstrate age, normative, , strength of left hand without left elbow pain for cooking tasks, in six weeks.         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       Plan of care was developed with input and agreement by the patient.      Riddhi Kwong, OT

## 2024-09-03 ENCOUNTER — TREATMENT (OUTPATIENT)
Dept: OCCUPATIONAL THERAPY | Facility: CLINIC | Age: 39
End: 2024-09-03
Payer: MEDICAID

## 2024-09-03 DIAGNOSIS — M77.12 LATERAL EPICONDYLITIS OF LEFT ELBOW: ICD-10-CM

## 2024-09-03 PROCEDURE — 97110 THERAPEUTIC EXERCISES: CPT | Mod: GO | Performed by: OCCUPATIONAL THERAPIST

## 2024-09-03 NOTE — PROGRESS NOTES
Occupational Therapy Orthopedic Treatment Note    Patient Name: Kylee Harry  MRN: [id number]  Today's Date: 9/8/2024  Time Calculation  Start Time: 0500  Stop Time: 0530  Time Calculation (min): 30 min    Insurance:  Visit number: 2 of 16  Authorization info: required  Insurance Type: [facility] community plan  Authorization certification  -  Start: 8/19/24  End: 11/11/24    Current Problem  1. Lateral epicondylitis of left elbow  Follow Up In Occupational Therapy          Referred by:  Dr. Marcial  Referred for: L tennis elbow  Onset/DOI:  pt reports, about 2 months ago     Fall risk: none  Precautions: none     Medical History Form: Reviewed (scanned into chart)    Subjective   Chief Complaint: L lateral elbow pain with insidious onset    Hand Dominance: Right    Pain:  2-7/10  Location: L lateral epicondyle  Description: aching, sharp, tingling    Objective     +TTP L lateral epicondyle  HAND STRENGTH (Lbs)   R L   Dynamometer  90lb 60lb       Outcome Measures:         QUICK DASH:      Home Program:  Exercise Sets/Reps Comments   Isometrics wrist extension hold 30/60     Eccentric stretching     Heat modality     Activity modification     Radial nerve glides     Median nerve glides                           Treatment Today    Therapeutic ex:  - median nerve glides to increase excursion and reduce nerve tension x10 min  - radial nerve glides to increase excursion and reduce nerve tension x 10 min  - wrist extension isometric holds 1ln DB to increase ECRB tendon strength x 10min - 30 sec holds      EDUCATION: Home exercise program, plan of care, activity modifications, joint protection, pain management, and injury pathology       Assessment:   Pt was able to complete all nerve glides initially with visual cueing and feedback for proper performance.  She found to have mild to moderate radial nerve tension to distal aspect of LUE. She understands to compelte these at home now the [facility].  Overall her localized lateral elbow  pain was minimally present today compared to nerve tension she was experiencing.    Level of Complexity  LOW complexity    OT Rehab Potential  Excellent      Plan:     Planned Interventions include: therapeutic exercise, self-care home management, manual therapy, therapeutic activities, , neuromuscular coordination, vasopneumatic, dry needling, and orthosis fabrication.  Frequency: 1-2 x Week  Duration: 6 Weeks    Goals: Set and discussed today         1) Patient will be able to return to all work tasks with no more than 2/10 left elbow pain, in six weeks.         2) Patient will demonstrate age, normative, , strength of left hand without left elbow pain for cooking tasks, in six weeks.         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       Plan of care was developed with input and agreement by the patient.      Riddhi Kwong, OT

## 2024-09-10 ENCOUNTER — APPOINTMENT (OUTPATIENT)
Dept: OCCUPATIONAL THERAPY | Facility: CLINIC | Age: 39
End: 2024-09-10
Payer: MEDICAID

## 2024-09-13 ENCOUNTER — APPOINTMENT (OUTPATIENT)
Dept: OCCUPATIONAL THERAPY | Facility: CLINIC | Age: 39
End: 2024-09-13
Payer: MEDICAID

## 2024-09-17 ENCOUNTER — TREATMENT (OUTPATIENT)
Dept: OCCUPATIONAL THERAPY | Facility: CLINIC | Age: 39
End: 2024-09-17
Payer: MEDICAID

## 2024-09-17 DIAGNOSIS — M77.12 LATERAL EPICONDYLITIS OF LEFT ELBOW: ICD-10-CM

## 2024-09-17 PROCEDURE — 97140 MANUAL THERAPY 1/> REGIONS: CPT | Mod: GO | Performed by: OCCUPATIONAL THERAPIST

## 2024-09-17 PROCEDURE — 97110 THERAPEUTIC EXERCISES: CPT | Mod: GO | Performed by: OCCUPATIONAL THERAPIST

## 2024-09-17 NOTE — PROGRESS NOTES
Occupational Therapy        Occupational Therapy Orthopedic Treatment Note    Patient Name: Kylee Harry  MRN: [id number]  Today's Date: 9/17/2024       Insurance:  Visit number: 3 of 16  Authorization info: required  Insurance Type: [facility] community plan  Authorization certification  -  Start: 8/19/24  End: 11/11/24    Current Problem  1. Lateral epicondylitis of left elbow  Follow Up In Occupational Therapy          Referred by:  Dr. Marcial  Referred for: L tennis elbow  Onset/DOI:  pt reports, about 2 months ago     Fall risk: none  Precautions: none     Medical History Form: Reviewed (scanned into chart)    Subjective Patient reports elbow pain has been a 7/10 even with splint on. Patient working 5 days a week 8 hours a day.   Chief Complaint: L lateral elbow pain with insidious onset  Patient reports she has not worn a tennis elbow strap  Patient reports current HEP increases her pain    Hand Dominance: Right    Pain:  Pre OT tx 7/10  post OT tx: 5/10  Location: L lateral epicondyle  Description: aching, sharp, tingling    Objective     +TTP L lateral epicondyle  HAND STRENGTH (Lbs)   R L                   9/17/2024   Dynamometer  90lb 60lb                 18#       Outcome Measures:         QUICK DASH:      Home Program:  Exercise Sets/Reps Comments   Isometrics wrist extension hold 30/60     Eccentric stretching     Heat modality     Activity modification     Radial nerve glides     Median nerve glides                           Treatment Today  US: 3MHz 1.0 wcm2 pulsed x 5 min to lateral elbow area    Therapeutic ex:    - PROM wrist flexion stretch 3x 20 sec  - hammer stretches for supination/pronation x 10 reps  - isometric wrist extension 5 x 10 sec    Manual Therapy: STM to dorsal forearm/lateral elbow area mills manipulation x25 min  Ortho fit/train: patient encouraged to purchase a tennis elbow strap and wear for work to decrease repetitive strain to left UE. Patient was shown tennis elbow straps online and  educated on wear, care precautions.    EDUCATION: Reviewed dx, healing, precautions  exercise program, plan of care, activity modifications, joint protection, US, cupping/manual therapy, pain management, and injury pathology       Assessment:   Patient presenting today with increased pain secondary repetitive strain to left arm at work. Patient reports HEP increases her pain. Patient reports she has not used any cold or worn a tennis elbow strap. Patient tolerated manual therapy well and reported decreased pain after.    Level of Complexity  LOW complexity    OT Rehab Potential  Excellent      Plan:    Plan to progress therapeutic exercise as tolerated, modalities for pain/inflammation and manual therapy for soft tissue restriction  Planned Interventions include: therapeutic exercise, self-care home management, manual therapy, therapeutic activities, , neuromuscular coordination, vasopneumatic, dry needling, and orthosis fabrication.  Frequency: 1-2 x Week  Duration: 6 Weeks    Goals: Set and discussed today         1) Patient will be able to return to all work tasks with no more than 2/10 left elbow pain, in six weeks.         2) Patient will demonstrate age, normative, , strength of left hand without left elbow pain for cooking tasks, in six weeks.         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       Plan of care was developed with input and agreement by the patient.      Benjy Grady, OT

## 2024-09-24 ENCOUNTER — TREATMENT (OUTPATIENT)
Dept: OCCUPATIONAL THERAPY | Facility: CLINIC | Age: 39
End: 2024-09-24
Payer: MEDICAID

## 2024-09-24 DIAGNOSIS — M77.12 LATERAL EPICONDYLITIS OF LEFT ELBOW: ICD-10-CM

## 2024-09-24 PROCEDURE — 97035 APP MDLTY 1+ULTRASOUND EA 15: CPT | Mod: GO | Performed by: OCCUPATIONAL THERAPIST

## 2024-09-24 PROCEDURE — 97140 MANUAL THERAPY 1/> REGIONS: CPT | Mod: GO | Performed by: OCCUPATIONAL THERAPIST

## 2024-09-24 PROCEDURE — 97110 THERAPEUTIC EXERCISES: CPT | Mod: GO | Performed by: OCCUPATIONAL THERAPIST

## 2024-09-24 NOTE — PROGRESS NOTES
"  Occupational Therapy Orthopedic Treatment Note    Patient Name: Kylee Harry  MRN: [id number]  Today's Date: 9/30/2024  Time Calculation  Start Time: 0510  Stop Time: 0550  Time Calculation (min): 40 min    Insurance:  Visit number: 4 of 16  Authorization info: required  Insurance Type: [facility] community plan  Authorization certification  -  Start: 8/19/24  End: 11/11/24    Current Problem  1. Lateral epicondylitis of left elbow  Follow Up In Occupational Therapy          Referred by:  Dr. Marcial  Referred for: L tennis elbow  Onset/DOI:  pt reports, about 2 months ago     Fall risk: none  Precautions: none     Medical History Form: Reviewed (scanned into chart)    Subjective   \"The pain really has not changed but it only hurts when I am at work because of the nature of my job\"    Hand Dominance: Right    Pain:  Pre OT tx 7/10  post OT tx: 5/10  Location: L lateral epicondyle  Description: aching, sharp, tingling    Objective     +TTP L lateral epicondyle  HAND STRENGTH (Lbs)   R L                   9/17/2024   Dynamometer  90lb 60lb                 18#       Outcome Measures:         QUICK DASH:      Home Program:  Exercise Sets/Reps Comments   Isometrics wrist extension hold 30/60     Eccentric stretching     Heat modality     Activity modification     Radial nerve glides     Median nerve glides                           Treatment Today  US:   - 3MHz 1.0 wcm2 pulsed x 8 min to lateral elbow area    Therapeutic ex:  - PROM wrist flexion stretch 3x 20 sec  - hammer stretches for supination/pronation x 10 reps  - isometric wrist extension 5 x 10 sec    Manual Therapy:   STM to dorsal forearm/lateral elbow area mills manipulation x15 min      EDUCATION: Reviewed dx, healing, precautions  exercise program, plan of care, activity modifications, joint protection, US, cupping/manual therapy, pain management, and injury pathology       Assessment:   Patient is able to tolerate all intervention today with low reactivity. She " does exhibit immediate lateral elbow pain with gentle tendon loading. Any level of SERV loading she will feel immediate pain. She is following through correctly with her HEP however, she understands given the nature of her job, her likelihood of further progress is low.      Level of Complexity  LOW complexity    OT Rehab Potential  Excellent      Plan:    Plan to progress therapeutic exercise as tolerated, modalities for pain/inflammation and manual therapy for soft tissue restriction  Planned Interventions include: therapeutic exercise, self-care home management, manual therapy, therapeutic activities, , neuromuscular coordination, vasopneumatic, dry needling, and orthosis fabrication.  Frequency: 1-2 x Week  Duration: 6 Weeks    Goals: Set and discussed today         1) Patient will be able to return to all work tasks with no more than 2/10 left elbow pain, in six weeks.         2) Patient will demonstrate age, normative, , strength of left hand without left elbow pain for cooking tasks, in six weeks.         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       Plan of care was developed with input and agreement by the patient.      Riddhi Kwong, OT

## 2024-10-01 ENCOUNTER — TREATMENT (OUTPATIENT)
Dept: OCCUPATIONAL THERAPY | Facility: CLINIC | Age: 39
End: 2024-10-01
Payer: MEDICAID

## 2024-10-01 DIAGNOSIS — M77.12 LATERAL EPICONDYLITIS OF LEFT ELBOW: ICD-10-CM

## 2024-10-01 PROCEDURE — 97140 MANUAL THERAPY 1/> REGIONS: CPT | Mod: GO | Performed by: OCCUPATIONAL THERAPIST

## 2024-10-01 PROCEDURE — 97035 APP MDLTY 1+ULTRASOUND EA 15: CPT | Mod: GO | Performed by: OCCUPATIONAL THERAPIST

## 2024-10-01 PROCEDURE — 97110 THERAPEUTIC EXERCISES: CPT | Mod: GO | Performed by: OCCUPATIONAL THERAPIST

## 2024-10-01 NOTE — PROGRESS NOTES
"  Occupational Therapy Orthopedic Treatment Note    Patient Name: Kylee Harry  MRN: [id number]  Today's Date: 10/4/2024  Time Calculation  Start Time: 0515  Stop Time: 0600  Time Calculation (min): 45 min    Insurance:  Visit number: 5 of 16  Authorization info: required  Insurance Type: [facility] community plan  Authorization certification  -  Start: 8/19/24  End: 11/11/24    Current Problem  1. Lateral epicondylitis of left elbow  Follow Up In Occupational Therapy          Referred by:  Dr. Marcial  Referred for: L tennis elbow  Onset/DOI:  pt reports, about 2 months ago     Fall risk: none  Precautions: none     Medical History Form: Reviewed (scanned into chart)    Subjective   \"I had zero pain for two days after the ultrasound last time but then after two days of work I had to call of because of the pain\"    Hand Dominance: Right    Pain:  Pre OT tx 5-6/10  post OT tx: 4/10  Location: L lateral epicondyle  Description: aching, sharp, tingling    Objective     +TTP L lateral epicondyle  HAND STRENGTH (Lbs)   R L                   9/17/2024   Dynamometer  90lb 60lb                 18#       Outcome Measures:         QUICK DASH:      Home Program:  Exercise Sets/Reps Comments   Isometrics wrist extension hold 30/60     Eccentric stretching     Heat modality     Activity modification     Radial nerve glides     Median nerve glides                           Treatment Today  US:   - 3MHz 1.0 wcm2 pulsed x 8 min to lateral elbow area    Therapeutic ex:  - PROM wrist flexion stretch 3x 20 sec  - hammer stretches for supination/pronation x 10 reps  - isometric wrist extension 5 x 10 sec    Manual Therapy:   - IASTM to dorsal forearm/lateral elbow area x15 min  - dry needling x 1 muscle ECRB    EDUCATION: Reviewed dx, healing, precautions  exercise program, plan of care, activity modifications, joint protection, US, cupping/manual therapy, pain management, and injury pathology       Assessment:   Continues to have immediate " lateral elbow pain with gentle tendon loading.  She is following through correctly with her HEP however, she understands given the nature of her job, her likelihood of further progress is low.      Level of Complexity  LOW complexity    OT Rehab Potential  Excellent      Plan:    Plan to progress therapeutic exercise as tolerated, modalities for pain/inflammation and manual therapy for soft tissue restriction  Planned Interventions include: therapeutic exercise, self-care home management, manual therapy, therapeutic activities, , neuromuscular coordination, vasopneumatic, dry needling, and orthosis fabrication.  Frequency: 1-2 x Week  Duration: 6 Weeks    Goals: Set and discussed today         1) Patient will be able to return to all work tasks with no more than 2/10 left elbow pain, in six weeks.         2) Patient will demonstrate age, normative, , strength of left hand without left elbow pain for cooking tasks, in six weeks.         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       Plan of care was developed with input and agreement by the patient.      Riddhi Kwong, OT

## 2024-10-30 ENCOUNTER — OFFICE VISIT (OUTPATIENT)
Dept: ORTHOPEDIC SURGERY | Facility: HOSPITAL | Age: 39
End: 2024-10-30
Payer: MEDICAID

## 2024-10-30 DIAGNOSIS — M77.12 LATERAL EPICONDYLITIS OF LEFT ELBOW: Primary | ICD-10-CM

## 2024-10-30 PROCEDURE — 20605 DRAIN/INJ JOINT/BURSA W/O US: CPT | Mod: LT | Performed by: SPECIALIST/TECHNOLOGIST

## 2024-10-30 PROCEDURE — 99213 OFFICE O/P EST LOW 20 MIN: CPT | Performed by: SPECIALIST/TECHNOLOGIST

## 2024-10-30 PROCEDURE — 2500000004 HC RX 250 GENERAL PHARMACY W/ HCPCS (ALT 636 FOR OP/ED): Performed by: SPECIALIST/TECHNOLOGIST

## 2024-10-30 PROCEDURE — 1036F TOBACCO NON-USER: CPT | Performed by: SPECIALIST/TECHNOLOGIST

## 2024-10-31 PROCEDURE — 20605 DRAIN/INJ JOINT/BURSA W/O US: CPT | Mod: LT | Performed by: SPECIALIST/TECHNOLOGIST

## 2024-10-31 RX ORDER — METHYLPREDNISOLONE ACETATE 40 MG/ML
40 INJECTION, SUSPENSION INTRA-ARTICULAR; INTRALESIONAL; INTRAMUSCULAR; SOFT TISSUE
Status: COMPLETED | OUTPATIENT
Start: 2024-10-31 | End: 2024-10-31

## 2024-10-31 RX ORDER — LIDOCAINE HYDROCHLORIDE 10 MG/ML
1 INJECTION, SOLUTION INFILTRATION; PERINEURAL
Status: COMPLETED | OUTPATIENT
Start: 2024-10-31 | End: 2024-10-31

## 2024-11-19 ENCOUNTER — OFFICE VISIT (OUTPATIENT)
Dept: OBSTETRICS AND GYNECOLOGY | Facility: CLINIC | Age: 39
End: 2024-11-19
Payer: MEDICAID

## 2024-11-19 VITALS
SYSTOLIC BLOOD PRESSURE: 142 MMHG | WEIGHT: 188.4 LBS | HEIGHT: 62 IN | BODY MASS INDEX: 34.67 KG/M2 | DIASTOLIC BLOOD PRESSURE: 100 MMHG

## 2024-11-19 DIAGNOSIS — O36.80X0 PREGNANCY, LOCATION UNKNOWN (HHS-HCC): ICD-10-CM

## 2024-11-19 DIAGNOSIS — I10 CHRONIC HYPERTENSION: ICD-10-CM

## 2024-11-19 DIAGNOSIS — Z32.01 PREGNANCY TEST POSITIVE (HHS-HCC): Primary | ICD-10-CM

## 2024-11-19 LAB — PREGNANCY TEST URINE, POC: POSITIVE

## 2024-11-19 PROCEDURE — 99214 OFFICE O/P EST MOD 30 MIN: CPT | Performed by: ADVANCED PRACTICE MIDWIFE

## 2024-11-19 PROCEDURE — 3008F BODY MASS INDEX DOCD: CPT | Performed by: ADVANCED PRACTICE MIDWIFE

## 2024-11-19 PROCEDURE — 1036F TOBACCO NON-USER: CPT | Performed by: ADVANCED PRACTICE MIDWIFE

## 2024-11-19 PROCEDURE — 3080F DIAST BP >= 90 MM HG: CPT | Performed by: ADVANCED PRACTICE MIDWIFE

## 2024-11-19 PROCEDURE — 3077F SYST BP >= 140 MM HG: CPT | Performed by: ADVANCED PRACTICE MIDWIFE

## 2024-11-19 PROCEDURE — 81025 URINE PREGNANCY TEST: CPT | Performed by: ADVANCED PRACTICE MIDWIFE

## 2024-11-19 RX ORDER — NIFEDIPINE 30 MG/1
30 TABLET, FILM COATED, EXTENDED RELEASE ORAL
Qty: 30 TABLET | Refills: 11 | Status: SHIPPED | OUTPATIENT
Start: 2024-11-19 | End: 2025-11-19

## 2024-11-19 ASSESSMENT — PAIN SCALES - GENERAL: PAINLEVEL_OUTOF10: 0-NO PAIN

## 2024-11-19 ASSESSMENT — ENCOUNTER SYMPTOMS
ENDOCRINE NEGATIVE: 0
CARDIOVASCULAR NEGATIVE: 0
ALLERGIC/IMMUNOLOGIC NEGATIVE: 0
CONSTITUTIONAL NEGATIVE: 0
NEUROLOGICAL NEGATIVE: 0
HEMATOLOGIC/LYMPHATIC NEGATIVE: 0
GASTROINTESTINAL NEGATIVE: 0
RESPIRATORY NEGATIVE: 0
PSYCHIATRIC NEGATIVE: 0
EYES NEGATIVE: 0
MUSCULOSKELETAL NEGATIVE: 0

## 2024-11-19 NOTE — PROGRESS NOTES
"Assessment/Plan   Problem List Items Addressed This Visit    None  Visit Diagnoses         Codes    Pregnancy test positive ([facility])    -  Primary Z32.01    Relevant Orders    POCT pregnancy, urine manually resulted (Completed)    Type And Screen    Pregnancy, location unknown ([facility])     O36.80X0    Relevant Orders    Human Chorionic Gonadotropin, Serum Quantitative    Chronic hypertension     I10    Relevant Medications    NIFEdipine ER (Adalat CC) 30 mg 24 hr tablet    miscellaneous medical supply [facility]        .    Kaye Thompson, APRN-ALLISONM    Sangeeta Harry is a 38 y.o. female who presents for evaluation of amenorrhea. The patient believes they could be pregnant. Pregnancy is desired. Sexual Activity: none. Current symptoms also include: breast tenderness and fatigue. Last period was normal.     Patient's last menstrual period was 10/23/2024 (approximate).    Objective   BP (!) 142/100   Ht 1.575 m (5' 2\")   Wt 85.5 kg (188 lb 6.4 oz)   LMP 10/23/2024 (Approximate)   BMI 34.46 kg/m²     OBGyn Exam     Lab Review  Urine HCG: positive   Hcg quant, type and screen ordered  Nifedipine 30 XL sent to pharmacy for chronic hypertension  BP cuff sent as well  RTO in 4 weeks for initial OB      "

## 2024-11-19 NOTE — LETTER
November 19, 2024     No Recipients    Patient: Kylee Harry   YOB: 1985   Date of Visit: 11/19/2024       Dear Dr. Johnson Recipients:    Thank you for referring Kylee Harry to me for evaluation. Below are my notes for this consultation.  If you have questions, please do not hesitate to call me. I look forward to following your patient along with you.       Sincerely,     CATALINA Carr-ALLISONM      CC: No Recipients  ______________________________________________________________________________________

## 2024-11-19 NOTE — LETTER
11/19/2024    To Whom It May Concern:    Kylee Harry was seen in office today and received testing for pregnancy which is currently positive.  Estimated Date of Delivery: None noted.    Sincerely,        MICHELLE Carr  [facility]

## 2024-12-03 ENCOUNTER — APPOINTMENT (OUTPATIENT)
Dept: OBSTETRICS AND GYNECOLOGY | Facility: CLINIC | Age: 39
End: 2024-12-03
Payer: MEDICAID

## 2024-12-17 ENCOUNTER — APPOINTMENT (OUTPATIENT)
Dept: OBSTETRICS AND GYNECOLOGY | Facility: CLINIC | Age: 39
End: 2024-12-17
Payer: MEDICAID

## 2024-12-17 ENCOUNTER — LAB (OUTPATIENT)
Dept: LAB | Facility: LAB | Age: 39
End: 2024-12-17
Payer: MEDICAID

## 2024-12-17 VITALS
WEIGHT: 191 LBS | SYSTOLIC BLOOD PRESSURE: 122 MMHG | DIASTOLIC BLOOD PRESSURE: 82 MMHG | HEIGHT: 62 IN | BODY MASS INDEX: 35.15 KG/M2

## 2024-12-17 DIAGNOSIS — Z32.01 PREGNANCY TEST POSITIVE (HHS-HCC): ICD-10-CM

## 2024-12-17 DIAGNOSIS — O36.80X0 PREGNANCY, LOCATION UNKNOWN (HHS-HCC): ICD-10-CM

## 2024-12-17 PROCEDURE — 84702 CHORIONIC GONADOTROPIN TEST: CPT

## 2024-12-17 PROCEDURE — 86900 BLOOD TYPING SEROLOGIC ABO: CPT

## 2024-12-17 PROCEDURE — 86850 RBC ANTIBODY SCREEN: CPT

## 2024-12-17 PROCEDURE — 86901 BLOOD TYPING SEROLOGIC RH(D): CPT

## 2024-12-17 PROCEDURE — 36415 COLL VENOUS BLD VENIPUNCTURE: CPT

## 2024-12-17 RX ORDER — METOCLOPRAMIDE 10 MG/1
10 TABLET ORAL 3 TIMES DAILY
Qty: 90 TABLET | Refills: 0 | Status: SHIPPED | OUTPATIENT
Start: 2024-12-17 | End: 2025-01-16

## 2024-12-17 ASSESSMENT — PAIN SCALES - GENERAL: PAINLEVEL_OUTOF10: 0-NO PAIN

## 2024-12-18 ENCOUNTER — APPOINTMENT (OUTPATIENT)
Dept: RADIOLOGY | Facility: HOSPITAL | Age: 39
End: 2024-12-18
Payer: MEDICAID

## 2024-12-18 LAB
ABO GROUP (TYPE) IN BLOOD: NORMAL
ANTIBODY SCREEN: NORMAL
B-HCG SERPL-ACNC: ABNORMAL MIU/ML
RH FACTOR (ANTIGEN D): NORMAL

## 2024-12-24 ENCOUNTER — APPOINTMENT (OUTPATIENT)
Dept: OBSTETRICS AND GYNECOLOGY | Facility: CLINIC | Age: 39
End: 2024-12-24
Payer: MEDICAID

## 2024-12-24 DIAGNOSIS — Z36.82 NUCHAL TRANSLUCENCY OF FETUS ON PRENATAL ULTRASOUND (HHS-HCC): Primary | ICD-10-CM

## 2024-12-24 DIAGNOSIS — Z3A.08 8 WEEKS GESTATION OF PREGNANCY (HHS-HCC): ICD-10-CM

## 2024-12-24 DIAGNOSIS — N76.0 BACTERIAL VAGINOSIS: ICD-10-CM

## 2024-12-24 DIAGNOSIS — I10 CHRONIC HYPERTENSION: ICD-10-CM

## 2024-12-24 DIAGNOSIS — B96.89 BACTERIAL VAGINOSIS: ICD-10-CM

## 2024-12-24 PROBLEM — Z98.891 HISTORY OF CESAREAN SECTION: Status: ACTIVE | Noted: 2024-12-24

## 2024-12-24 PROBLEM — O99.210 MATERNAL OBESITY AFFECTING PREGNANCY, ANTEPARTUM (HHS-HCC): Status: ACTIVE | Noted: 2024-12-24

## 2024-12-24 PROBLEM — Z82.79 FAMILY HISTORY OF CONGENITAL HEART DEFECT: Status: ACTIVE | Noted: 2024-12-24

## 2024-12-24 PROBLEM — O10.919 CHRONIC HYPERTENSION AFFECTING PREGNANCY (HHS-HCC): Status: ACTIVE | Noted: 2024-12-24

## 2024-12-24 PROCEDURE — 87086 URINE CULTURE/COLONY COUNT: CPT

## 2024-12-24 PROCEDURE — H1000 PRENATAL CARE ATRISK ASSESSM: HCPCS | Performed by: ADVANCED PRACTICE MIDWIFE

## 2024-12-24 PROCEDURE — 87591 N.GONORRHOEAE DNA AMP PROB: CPT

## 2024-12-24 PROCEDURE — 87491 CHLMYD TRACH DNA AMP PROBE: CPT

## 2024-12-24 PROCEDURE — 99214 OFFICE O/P EST MOD 30 MIN: CPT | Performed by: ADVANCED PRACTICE MIDWIFE

## 2024-12-24 PROCEDURE — 87205 SMEAR GRAM STAIN: CPT

## 2024-12-24 RX ORDER — NAPROXEN SODIUM 220 MG/1
162 TABLET, FILM COATED ORAL DAILY
Qty: 60 TABLET | Refills: 2 | Status: SHIPPED | OUTPATIENT
Start: 2024-12-24 | End: 2025-03-24

## 2024-12-24 RX ORDER — ACETAMINOPHEN 500 MG
1 TABLET ORAL 2 TIMES DAILY
Qty: 1 KIT | Refills: 0 | Status: SHIPPED | OUTPATIENT
Start: 2024-12-24

## 2024-12-24 ASSESSMENT — EDINBURGH POSTNATAL DEPRESSION SCALE (EPDS)
I HAVE BLAMED MYSELF UNNECESSARILY WHEN THINGS WENT WRONG: NO, NEVER
I HAVE FELT SCARED OR PANICKY FOR NO GOOD REASON: NO, NOT AT ALL
THINGS HAVE BEEN GETTING ON TOP OF ME: NO, I HAVE BEEN COPING AS WELL AS EVER
I HAVE BEEN ABLE TO LAUGH AND SEE THE FUNNY SIDE OF THINGS: AS MUCH AS I ALWAYS COULD
THE THOUGHT OF HARMING MYSELF HAS OCCURRED TO ME: NEVER
I HAVE BEEN SO UNHAPPY THAT I HAVE HAD DIFFICULTY SLEEPING: NOT AT ALL
TOTAL SCORE: 1
I HAVE LOOKED FORWARD WITH ENJOYMENT TO THINGS: AS MUCH AS I EVER DID
I HAVE BEEN ANXIOUS OR WORRIED FOR NO GOOD REASON: HARDLY EVER
I HAVE BEEN SO UNHAPPY THAT I HAVE BEEN CRYING: NO, NEVER
I HAVE FELT SAD OR MISERABLE: NO, NOT AT ALL

## 2024-12-24 NOTE — PROGRESS NOTES
Assessment   Problem List Items Addressed This Visit    None  Visit Diagnoses         Codes    8 weeks gestation of pregnancy ([facility])     Z3A.08    Relevant Orders    CBC Anemia Panel With Reflex,Pregnancy    Type And Screen    Hemoglobin Identification with Path Review    C. trachomatis / N. gonorrhoeae, Amplified    Hepatitis C Antibody    Urine Culture    Hemoglobin A1C    [facility] Prenatal Screen    Syphilis Screen with Reflex    Rubella Antibody, IgG    Hepatitis B Surface Antigen    HIV 1/2 Antigen/Antibody Screen with Reflex to Confirmation    Vaginitis Gram Stain For Bacterial Vaginosis + Yeast    [facility] Prenatal Screen            Plan   - New OB resources provided and reviewed with particular attention to dietary, travel, and medication restrictions  - Oriented to practice, CNM vs. MD care  - Reviewed bleeding precautions, warning signs, when to call provider; phone number provided  - Routine NOB labs ordered  - Return in 4 weeks for routine prenatal care    CATALINA Crar-BÁRBARA Rutherford   Kylee Harry is a 39 y.o.  at 8w6d with a working estimated date of delivery of 2025, by Last Menstrual Period who presents for an initial prenatal visit. This pregnancy is planned.    Patient currently experiencing: nausea, currently taking reglan has been ordered, not taking  Bleeding or cramping since LMP: no  Taking prenatal vitamin: Yes  Ultrasound completed this pregnancy: No    Last pap: up to date    OB History    Para Term  AB Living   10 2 2   3 2   SAB IAB Ectopic Multiple Live Births     3     2      # Outcome Date GA Lbr Mir/2nd Weight Sex Type Anes PTL Lv   10 Current            9 IAB            8 Term 07 39w0d   M CS-Unspec   [place]      Birth Comments: uncomplicated   7 IAB 06     [facility] [place]   DEC   6 Term 05 39w0d  2.948 kg F CS-Unspec  N [place]      Birth Comments: hypoplastic left heart   5 IAB 02 5w0d    [facility] [place]    DEC   4             3             2             1               [place]  Depression Scale Total: 1  Previous history of gestational diabetes? no  Previous history of third or fourth degree laceration? no  Previous history of hypertension? Yes, chronic  Previous history of  section or other uterine surgery? Yes, x 2  Plan for genetic testing? Yes with gender? no    Prior pregnancy complications: prior  section    History of hypertension:  Yes, chronic hypertension    Past Medical History:   Diagnosis Date    Abnormal Pap smear of cervix     Anemia     Depression     HPV (human papilloma virus) infection     Kidney stone     Migraine     Other specified health status     No pertinent past medical history      Past Surgical History:   Procedure Laterality Date     SECTION, LOW TRANSVERSE      OTHER SURGICAL HISTORY  2022     section    OTHER SURGICAL HISTORY  2022    Breast reduction      Social History     Socioeconomic History    Marital status: Single   Tobacco Use    Smoking status: Never    Smokeless tobacco: Never   Vaping Use    Vaping status: Never Used   Substance and Sexual Activity    Alcohol use: Not Currently    Drug use: Never    Sexual activity: Yes        Objective   Physical Exam     TW.361 kg (3 lb)   Expected Total Weight Gain: 5 kg (11 lb)-9 kg (19 lb)   Pregravid BMI: 34.38       ASA in pregnancy  Moderate risk  Women with at least two moderate risk factors including:  [] Nulliparity  [] Obesity (BMI > 30)  [] Mother or sister with a history of PEC/eclampsia/HELLP  []  race  [] Age >= 35 years  [] Previous pregnancy with Low Birth Weight (LBW) or Small for Gestational  Age (SGA) infant  [] Previous adverse pregnancy outcome (stillbirth)  [] Pregnancy interval > 10 years  High risk  Women with at least one high risk factor including:  [] Any history of PEC/eclampsia/HELLP  [] Multifetal  gestation  [] Chronic Hypertension  [] Pre-existing type 1 or 2 diabetes  [] Renal disease with proteinuria (P:C >= 0.3 mg/mg) or serum CR >= 1 mg/dL  [] Autoimmune disease (systemic lupus erythematosus, antiphospholipid  syndrome)  [] Additional indications per provider discretion    Review of Systems     OBGyn Exam   C/o vaginal odor.   Vaginitis panel ordered.     Postpartum Depression: Low Risk  (2024)    [place]  Depression Scale     Last EPDS Total Score: 1     Last EPDS Self Harm Result: Never        Pregnancy Problems (from 24 to present)       No problems associated with this episode.             Education provided:   Avoidance of alcohol, tobacco and drug use   Dietary restrictions reviewed including avoiding raw or poorly cooked meat, lunch meat and soft cheeses  3.    Adequate water intake.  Avoid empty calories with juices  4.    Recommendation for weight gain based on initial BMI (body mass index)  5.    Limit caffeine to less than 200-300 mg/day  6.    Take folic acid 400 mcg daily.  Incorporate 5,000u Vitamin D3 per day.  Discuss Magnesium Supplementation  7.    Importance of good sleep hygiene and avoidance of laying on back after 15 weeks  8.    Encourage daily physical activity of 30 minutes a day the majority of the days of the week  9.    Discussed normal physiologic changes:  Round ligament pain, nausea, breast tenderness  10.  Discussed natural remedies, vitamins and prescription medications for nausea  11.  Baby aspirin 162mg daily (two baby aspirin) for the reduction of pre-eclampsia during pregnancy.  Even if you have          never had any blood pressure issues, you can develop hypertension during your pregnancy.  This has been well          Studied and safe to take starting at 12 weeks gestation until after the birth of your baby.    **IF AT ANYTIME DURING YOUR PREGNANCY YOU HAVE CONCERNS THAT YOU CANNOT AFFORD HEALTHY FOOD PLEASE LET US KNOW!**  We have a Food for  Life program and would be happy to place a referral for you.  It is so important to eat healthy during your pregnancy and we treat food as medicine.  Healthy food is expensive!  This program will allow you and your family up to 4 to receive food and recipes for one week per month.  This needs to be renewed every 6 months.    Ultrasound and screening for aneuploidies (Down Syndrome/Trisomy 21, Trisomy 13 + 18)  A requisition has been placed for a dating ultrasound.  Please call to get that scheduled.    At this ultrasound they will ask you if your would like to proceed with screening for genetic disorders that are listed above.  They will do that with an ultrasound at approximately 13 weeks and we also draw blood work for screening which includes the fetal sex if you desire to know.    Ultrasound for anatomy will be done at 19 weeks.  Based on risk factors and any concerns the maternal fetal medicine provider has, you may need a repeat ultrasound.  Healthy pregnancies that do not have any other concerns by the midwife or maternal fetal medicine do not have any repeat ultrasounds done.    Labs:   An order will be placed for your new ob labs.  Please get those done at the time of your ultrasound.  They will collect          multiple tubes of blood for new ob labs and also urine for STI testing and a urine culture.   If there are any concerns with any blood work or urine testing WE WILL CALL YOU OR COMMUNICATE VIA          [email or web address]!!!   The biggest concerns our patients have is when they see their complete blood count.  The reference          range in the result is for a non pregnant person!  We will notify you if there is any need to start an iron supplement.  3.    At 26-28 weeks a glucose test is ordered to see if you have gestational diabetes.  We also reassess if you have          Anemia, which can be common in pregnancy  4.    Group B strep culture will be done at 36 weeks gestation.    We also recommend that you  "be screened once in your life for Cystic Fibrosis and Spinal Muscular Atrophy.  This assesses if we need your partner to be tested if you are a carrier of a gene that can be passed along to your baby.  For this to happen, both parents must be a carrier to the gene.    Choices for care and hospital for birth:  I am a Certified Nurse Midwife and practice in a group setting, which means that any of the midwives in my group practice may be there for your birth.  We care for healthy females during pregnancy and labor/birth.  We practice in collaboration with physicians within our group.  If there are any concerns with your pregnancy, labor or birth our physicians work closely with us.      The midwives in our practice strongly encourage you to explore the option of Centering Pregnancy which has been studied for better birth outcomes!  Care will be done in a group setting with 1:1 time with a midwife and then in depth education about every stage of your pregnancy, labor/birth,  care, feeding choices, pediatrician options, birth control and coping techniques for the first few weeks after birth.  We have day groups and our [place] location and a Monday evening group at [facility].  The groups follow your normal prenatal schedule and yes, we keep in contact and I see you at the end of your pregnancy.    There are certain medical conditions that \"risks you out\" of midwifery care that we are constantly assessing for.  Some conditions during your pregnancy that would risk you out of midwifery care would be:   Severe growth restriction of your baby   Labor/Birth  less than 35 weeks   Severe pre-eclampsia at any time during your pregnancy/labor/birth   Gestational Diabetes needing medication (insulin) to control your blood sugars   If you decline or do not complete your glucose testing to rule out diet controlled diabetes by 32 weeks   If you are diagnosed with chronic hypertension during your pregnancy and need to start " medication    The options for birth where we provide 24/7 Certified Nurse Midwifery coverage with a board certified OB/GYN, in house anesthesia and neonataology coverage are:    [facility] at [place], [place]    To call for questions regarding your care of if you are in labor is [phone]  My nurse can answer questions and keep me updated should any questions or concerns arise during your pregnancy.    After hours, the answering service will ask you where you intended to give birth and connect you to the midwife on call at [facility] or the [facility].    If you would like to tour either facility:  Please call the Childbirth education line at [phone]    Danger signs to report:  Seek medical care immediately if you have pain that is doubling you over or vaginal bleeding that is heavier than a  period  Notify the office should you have any burning, urgency, frequency of urination or other concerning symptoms.    Medications that are safe for common discomforts of pregnancy:   Tylenol   Tums or Papaya extract for any upset stomach or heartburn   Zyrtec, Claritin, Benadryl for allergy symptoms   Sudafed or Robitussin for cold symptoms  [employer] is an victor hugo that is great for what medications are safe to take throughout your pregnancy and breastfeeding journey through the first year of life!  Well worth the $3.99    Work restrictions:  A normal healthy pregnancy without any complications are able to have the standard pregnancy work restrictions which is no pushing/pulling/lifting greater than 25 pounds independently    FMLA paperwork  Can be brought to the office for us to fill out for when you are starting your maternity leave (either your scheduled date of going to the hospital or your due date).  We cannot give out early FMLA when there is no documented medical conditions that are considered  "\"normal pregnancy\" events.    Comfort measures   Chiropractors are great for alleviation of ligament pain   Yoga is good for your ligaments and mentally preparing for baby and labor.  A prenatal yoga class is recommended.  3.     Prenatal massages are fine  4.     A maternity support belt is an amazing thing that can help ligament pain -- can be purchased on Amazon  5.     Good supportive shoes are key to helping with ligament pain    Dental care  It is very important to see a dentist during your pregnancy for routine cleaning and also if you develop any dental pain during your pregnancy.  Healthy gums and teeth are very important during your pregnancy.  We can provide you with a dental letter if your dentist would like one.    Thank you for choosing our practice and [facility] for you healthcare!  I am excited to partner with you during this very special time!     If there is anything I can do to help make your experience is positive, please come to your visits with questions and concerns and do not be afraid to ask what is on your mind!  We will see you in the office every 4 weeks until you are 30 weeks, then every 2 weeks until 36 weeks and then weekly until your baby is born.    Until then, be well!  Kaye Thompson, MICHELLE     "

## 2024-12-24 NOTE — PATIENT INSTRUCTIONS
Vitamin b6 - 25 mg  Half tablet of unisom      .TAKING GOOD CARE OF YOURSELF WHILE YOU ARE PREGNANT    What Should I Eat?  You do not have to eat a lot more food during pregnancy. But it is important to eat the right food--the most  healthy food for you and your baby. Every day, make sure you have:  6 to 8 large glasses of water.  6 to 9 servings of whole grain foods like bread or pasta. By reading the label, you will know that you are getting ‘‘whole’’ grain and not just brown-colored bread or pasta (1 slice of bread or a half cup of cooked pasta is a serving).  3 to 4 servings of fruit. Fresh, raw fruit is best (1 small apple or a half cup of chopped fruit is a serving).  4 to 5 servings of vegetables (1 medium carrot or a half cup of chopped vegetables is a serving).  2 to 3 servings of lean meat, fish, eggs, or nuts. (A piece ofmeat the size of a pack of playing cards is 1 serving.)  1 serving of vitamin C-rich food, like oranges, sweet peppers, or tomatoes (one half cup is a serving).  2 to 3 servings of iron-rich foods, like black-eyed peas, sweet potatoes, greens, dried fruit, or meat.  1 serving of a food rich in folic acid, like dark green, leafy vegetables (one half cup is a serving).    Are Some Foods Dangerous?  Most women can eat any food they want while they are pregnant. But there are some foods that can be  dangerous to the health of your baby.    Fish -- Fish is good food. And it is an important food for growing a smart baby. But some fish have lots of dangerous chemicals. To avoid these chemicals:  Do not eat swordfish, shark, rosalva mackerel, or tilefish.  Eat salmon no more than 1 time per week.  Eat only ‘‘light’’ tuna. Do not eat albacore tuna.    Milk and cheese -- Dairy products are an important source of calcium, and calcium helps build strong bones and teeth. But some dairy products carry dangerous germs. To keep yourself and your baby safe, eat and drink only dairy products--such as milk,  yogurt, and cheese--that are pasteurized.    Prepared foods -- Any food that is spoiled or not cooked well can make you sick.  Do not eat any meat or fish that has not been cooked all the way through.  Do not eat any cooked food that has not been kept hot or chilled.  Wash knives, cutting boards, and your hands between handling raw meat and any other food--like fruits and vegetables--that you plan to eat raw.  Wash all fruits and vegetables with 1 tablespoon of vinegar in a pan of water to kill germs before you eat them.    Alcohol -- We know that alcohol is dangerous for your baby if you drink a lot during your pregnancy. It is safest to avoid all alcohol.    Coffee -- The most recent studies say that 2 cups of caffeinated drink each day is safe during pregnancy. This means 2 small cups of coffee or tea or 1 can of caffeinated soda.    Weight Gain  On average, the total amount of weight gain during pregnancy will fall in the following ranges:    Weight Type Average Pounds   Normal weight (BMI 18.5 - 24.9) 25 - 35 pounds   Underweight (BMI less than 18.5) 28 - 40 pounds   Overweight (BMI 25 - 29.9) 15 - 25 pounds   Obese (BMI greater or equal to 30) 11 - 20 pounds       Do I Need to Take Vitamins?  Even if your diet is good, a daily multivitamin is a good idea. All prenatal vitamins are pretty much the same,  so buy the cheapest kind. If you find that your vitamins upset your stomach, take a children’s chewable or gummy  vitamin. Be sure you get at least 400 micrograms of folic acid every day in the vitamin you chose. The number  of micrograms of folic acid is on the label of the bottle.    Is Exercise Important?  Yes! You are getting ready for an athletic event: labor! Daily exercise will help you stay fit, control your  weight, and be prepared for labor. Every day, try to get at least 30 minutes of moderate exercise like walking  or swimming. Do deep squats several times a day. This exercise will help control low  back pain and help  prepare your pelvis for delivery.    Are Some Exercises Dangerous?  You can continue to do pretty much any exercise you have been doing. It is important to avoid any danger of  blows to your stomach. You should avoid scuba diving and contact sports.    What if I Get Sick--Can I Take Medicine?  It is important to limit the medicines you take as much as possible. It is safe to take acetaminophen  (Tylenol). Avoid NSAIDs like ibuprofen (Motrin) and naproxen (Aleve).    Head cold -- Drink lots of fluids, gargle with warm salt water, take warm baths or showers, take Tylenol for headache and sore throat, suck on throat lozenges    Headaches -- Drink at least 8 big glasses of water every day, eat something healthy every 2 to 3 hours during the day, and take Tylenol    Constipation -- Drink lots of water, eat lots of fruits and vegetables, including dried fruits like prunes, and use a fiber supplement like Metamucil    Are There Danger Signs That I Need to Watch Out For?  Call your health care provider if:  You start to bleed like a period  You are leaking fluid  Your baby is not moving (after 24 weeks into your pregnancy)  You are having very bad headaches or your vision is blurry or you see ‘‘spots’’  You are having very bad pain  You are feeling very frightened or sad  You are very worried about something  NIPT testing:  NIPT Summary of Recommendations  Prenatal genetic screening (serum screening with or without nuchal translucency [NT] ultrasound or cell-free DNA screening) and diagnostic testing (chorionic villus sampling [CVS] or amniocentesis) options should be discussed and offered to all pregnant patients regardless of maternal age or risk of chromosomal abnormality. After review and discussion, every patient has the right to pursue or decline prenatal genetic screening and diagnostic testing.  If screening is accepted, patients should have one prenatal screening approach, and should not have  multiple screening tests performed simultaneously.  Cell-free DNA is the most sensitive and specific screening test for the common fetal aneuploidies. Nevertheless, it has the potential for false-positive and false-negative results. Furthermore, cell-free DNA testing is not equivalent to diagnostic testing.  All patients should be offered a second-trimester ultrasound for fetal structural defects, since these may occur with or without fetal aneuploidy; ideally this procedure is performed between 18 and 22 weeks of gestation (with or without second?trimester maternal serum alpha?fetoprotein).  Patients with a positive screening test result for fetal aneuploidy should undergo genetic counseling and a comprehensive ultrasound evaluation with an opportunity for diagnostic testing to confirm results.  Patients with a negative screening test result should be made aware that this substantially decreases their risk of the targeted aneuploidy but does not ensure that the fetus is unaffected. The potential for a fetus to be affected by genetic disorders that are not evaluated by the screening or diagnostic test should also be reviewed. Even if patients have a negative screening test result, they may choose diagnostic testing later in pregnancy, particularly if additional findings such as fetal anomalies identified on ultrasound examination become evident.  Patients whose cell-free DNA screening test results are not reported by the laboratory or are uninterpretable (a no?call test result) should be informed that test failure is associated with an increased risk of aneuploidy, receive further genetic counseling and be offered comprehensive ultrasound evaluation and diagnostic testing.  If an enlarged nuchal translucency or an anomaly is identified on ultrasound examination, the patient should be offered genetic counseling and diagnostic testing for genetic conditions and a comprehensive ultrasound evaluation including detailed  ultrasonography at 18-22 weeks of gestation to assess for structural abnormalities.  The use of cell-free DNA screening as follow-up for patients with a screen positive serum analyte screening test result is an option for patients who want to avoid a diagnostic test. However, patients should be informed that this approach may delay definitive diagnosis and will fail to identify some fetuses with chromosomal abnormalities.  In clinical situations of an isolated soft ultrasonographic marker (such as echogenic cardiac focus, choroid plexus cyst, pyelectasis, short humerus or femur length) where aneuploidy screening has not been performed, the patient should be counseled regarding the risk of aneuploidy associated with the finding and cell-free DNA, quad screen testing, or amniocentesis should be offered. If aneuploidy testing is performed and is low risk, then no further risk assessment is needed. If more than one marker is identified, then genetic counseling, maternal-fetal medicine consultation, or both are recommended.  No method of aneuploidy screening that includes a serum sample is as accurate in twin gestations as it is in rodriguez pregnancies; this information should be incorporated into pretest counseling for patients with multiple gestations.  Cell-free DNA screening can be performed in twin pregnancies. Overall, performance of screening for trisomy 21 by cell-free DNA in twin pregnancies is encouraging, but the total number of reported affected cases is small. Given the small number of affected cases it is difficult to determine an accurate detection rate for trisomy 18 and 13.  Because preimplantation genetic testing is not uniformly accurate, prenatal screening and prenatal diagnosis should be offered to all patients regardless of previous preimplantation genetic testing.  The use of multiple serum screening approaches performed independently (eg, a first-trimester screening test followed by a quad screen  as an unlinked test) is not recommended because it will result in an unacceptably high positive screening rate and could deliver contradictory risk estimates.  In multifetal gestations, if a fetal demise, vanishing twin, or anomaly is identified in one fetus, there is a significant risk of an inaccurate test result if serum-based aneuploidy screening or cell-free DNA is used. This information should be reviewed with the patient and diagnostic testing should be offered.  Patients with unusual or multiple aneuploidies detected by cell-free DNA should be referred for genetic counseling and maternal-fetal medicine consultation.  Our contact information is below in case you or your family need to call:  Your health care provider’s name: MICHELLE Carr  Your health care provider’s phone number: [phone]

## 2024-12-25 PROBLEM — O09.521 MULTIGRAVIDA OF ADVANCED MATERNAL AGE IN FIRST TRIMESTER (HHS-HCC): Status: ACTIVE | Noted: 2024-12-25

## 2024-12-25 LAB
C TRACH RRNA SPEC QL NAA+PROBE: NEGATIVE
CLUE CELLS VAG LPF-#/AREA: PRESENT /[LPF]
N GONORRHOEA DNA SPEC QL PROBE+SIG AMP: NEGATIVE
NUGENT SCORE: 10
YEAST VAG WET PREP-#/AREA: ABNORMAL

## 2024-12-25 RX ORDER — METRONIDAZOLE 500 MG/1
500 TABLET ORAL 2 TIMES DAILY
Qty: 14 TABLET | Refills: 0 | Status: SHIPPED | OUTPATIENT
Start: 2024-12-25 | End: 2025-01-01

## 2024-12-26 LAB — BACTERIA UR CULT: NORMAL

## 2025-01-10 ENCOUNTER — LAB (OUTPATIENT)
Dept: LAB | Facility: LAB | Age: 40
End: 2025-01-10
Payer: MEDICAID

## 2025-01-10 ENCOUNTER — HOSPITAL ENCOUNTER (OUTPATIENT)
Dept: RADIOLOGY | Facility: CLINIC | Age: 40
Discharge: HOME | End: 2025-01-10
Payer: MEDICAID

## 2025-01-10 DIAGNOSIS — O99.019 ANEMIA AFFECTING PREGNANCY, ANTEPARTUM (HHS-HCC): ICD-10-CM

## 2025-01-10 DIAGNOSIS — Z32.01 PREGNANCY TEST POSITIVE (HHS-HCC): ICD-10-CM

## 2025-01-10 DIAGNOSIS — D18.1 CYSTIC HYGROMA: Primary | ICD-10-CM

## 2025-01-10 DIAGNOSIS — Z3A.08 8 WEEKS GESTATION OF PREGNANCY (HHS-HCC): ICD-10-CM

## 2025-01-10 DIAGNOSIS — I10 CHRONIC HYPERTENSION: ICD-10-CM

## 2025-01-10 LAB
ABO GROUP (TYPE) IN BLOOD: NORMAL
ALBUMIN SERPL BCP-MCNC: 4.4 G/DL (ref 3.4–5)
ALP SERPL-CCNC: 52 U/L (ref 33–110)
ALT SERPL W P-5'-P-CCNC: 7 U/L (ref 7–45)
ANION GAP SERPL CALC-SCNC: 14 MMOL/L (ref 10–20)
ANTIBODY SCREEN: NORMAL
AST SERPL W P-5'-P-CCNC: 15 U/L (ref 9–39)
BILIRUB SERPL-MCNC: 0.7 MG/DL (ref 0–1.2)
BUN SERPL-MCNC: 8 MG/DL (ref 6–23)
CALCIUM SERPL-MCNC: 9.5 MG/DL (ref 8.6–10.3)
CHLORIDE SERPL-SCNC: 100 MMOL/L (ref 98–107)
CO2 SERPL-SCNC: 24 MMOL/L (ref 21–32)
CREAT SERPL-MCNC: 0.6 MG/DL (ref 0.5–1.05)
CREAT UR-MCNC: 151.3 MG/DL (ref 20–320)
EGFRCR SERPLBLD CKD-EPI 2021: >90 ML/MIN/1.73M*2
ERYTHROCYTE [DISTWIDTH] IN BLOOD BY AUTOMATED COUNT: 19.1 % (ref 11.5–14.5)
GLUCOSE SERPL-MCNC: 90 MG/DL (ref 74–99)
HCT VFR BLD AUTO: 30.7 % (ref 36–46)
HGB BLD-MCNC: 10.1 G/DL (ref 12–16)
MCH RBC QN AUTO: 25.7 PG (ref 26–34)
MCHC RBC AUTO-ENTMCNC: 32.9 G/DL (ref 32–36)
MCV RBC AUTO: 78 FL (ref 80–100)
NRBC BLD-RTO: 0 /100 WBCS (ref 0–0)
PLATELET # BLD AUTO: 349 X10*3/UL (ref 150–450)
POTASSIUM SERPL-SCNC: 3.2 MMOL/L (ref 3.5–5.3)
PROT SERPL-MCNC: 7.5 G/DL (ref 6.4–8.2)
PROT UR-ACNC: 12 MG/DL (ref 5–24)
PROT/CREAT UR: 0.08 MG/MG CREAT (ref 0–0.17)
RBC # BLD AUTO: 3.93 X10*6/UL (ref 4–5.2)
RH FACTOR (ANTIGEN D): NORMAL
SODIUM SERPL-SCNC: 135 MMOL/L (ref 136–145)
WBC # BLD AUTO: 7.7 X10*3/UL (ref 4.4–11.3)

## 2025-01-10 PROCEDURE — 76801 OB US < 14 WKS SINGLE FETUS: CPT

## 2025-01-10 PROCEDURE — 83550 IRON BINDING TEST: CPT

## 2025-01-10 PROCEDURE — 84156 ASSAY OF PROTEIN URINE: CPT

## 2025-01-10 PROCEDURE — 83020 HEMOGLOBIN ELECTROPHORESIS: CPT | Performed by: ADVANCED PRACTICE MIDWIFE

## 2025-01-10 PROCEDURE — 87389 HIV-1 AG W/HIV-1&-2 AB AG IA: CPT

## 2025-01-10 PROCEDURE — 86317 IMMUNOASSAY INFECTIOUS AGENT: CPT

## 2025-01-10 PROCEDURE — 82728 ASSAY OF FERRITIN: CPT

## 2025-01-10 PROCEDURE — 86803 HEPATITIS C AB TEST: CPT

## 2025-01-10 PROCEDURE — 85027 COMPLETE CBC AUTOMATED: CPT

## 2025-01-10 PROCEDURE — 82570 ASSAY OF URINE CREATININE: CPT

## 2025-01-10 PROCEDURE — 86850 RBC ANTIBODY SCREEN: CPT

## 2025-01-10 PROCEDURE — 83021 HEMOGLOBIN CHROMOTOGRAPHY: CPT

## 2025-01-10 PROCEDURE — 83036 HEMOGLOBIN GLYCOSYLATED A1C: CPT

## 2025-01-10 PROCEDURE — 86900 BLOOD TYPING SEROLOGIC ABO: CPT

## 2025-01-10 PROCEDURE — 80053 COMPREHEN METABOLIC PANEL: CPT

## 2025-01-10 PROCEDURE — 86780 TREPONEMA PALLIDUM: CPT

## 2025-01-10 PROCEDURE — 87340 HEPATITIS B SURFACE AG IA: CPT

## 2025-01-10 PROCEDURE — 86901 BLOOD TYPING SEROLOGIC RH(D): CPT

## 2025-01-11 LAB
EST. AVERAGE GLUCOSE BLD GHB EST-MCNC: 100 MG/DL
FERRITIN SERPL-MCNC: 19 NG/ML
HBA1C MFR BLD: 5.1 %
HBV SURFACE AG SERPL QL IA: NONREACTIVE
HCV AB SER QL: NONREACTIVE
HIV 1+2 AB+HIV1 P24 AG SERPL QL IA: NONREACTIVE
IRON SATN MFR SERPL: 8 %
IRON SERPL-MCNC: 36 UG/DL
REFLEX ADDED, ANEMIA PANEL: NORMAL
RUBV IGG SERPL IA-ACNC: 2.8 IA
RUBV IGG SERPL QL IA: POSITIVE
TIBC SERPL-MCNC: 426 UG/DL
TREPONEMA PALLIDUM IGG+IGM AB [PRESENCE] IN SERUM OR PLASMA BY IMMUNOASSAY: NONREACTIVE
UIBC SERPL-MCNC: 390 UG/DL

## 2025-01-12 PROBLEM — O99.019 ANEMIA AFFECTING PREGNANCY, ANTEPARTUM (HHS-HCC): Status: ACTIVE | Noted: 2025-01-12

## 2025-01-12 PROBLEM — O28.3 ABNORMAL FETAL ULTRASOUND: Status: ACTIVE | Noted: 2025-01-12

## 2025-01-12 RX ORDER — FERROUS SULFATE 324(65)MG
65 TABLET, DELAYED RELEASE (ENTERIC COATED) ORAL
Qty: 30 TABLET | Refills: 3 | Status: SHIPPED | OUTPATIENT
Start: 2025-01-12 | End: 2025-05-12

## 2025-01-14 LAB
HEMOGLOBIN A2: 2.6 % (ref 2–3.5)
HEMOGLOBIN A: 97.1 % (ref 95.8–98)
HEMOGLOBIN F: 0.3 % (ref 0–2)
HEMOGLOBIN IDENTIFICATION INTERPRETATION: NORMAL
PATH REVIEW-HGB IDENTIFICATION: NORMAL

## 2025-01-15 ENCOUNTER — CLINICAL SUPPORT (OUTPATIENT)
Dept: GENETICS | Facility: CLINIC | Age: 40
End: 2025-01-15
Payer: MEDICAID

## 2025-01-15 VITALS — WEIGHT: 183 LBS | SYSTOLIC BLOOD PRESSURE: 131 MMHG | BODY MASS INDEX: 33.47 KG/M2 | DIASTOLIC BLOOD PRESSURE: 84 MMHG

## 2025-01-15 DIAGNOSIS — O09.521 SUPERVISION OF ELDERLY MULTIGRAVIDA IN FIRST TRIMESTER: ICD-10-CM

## 2025-01-15 DIAGNOSIS — Z31.5 ENCOUNTER FOR PROCREATIVE GENETIC COUNSELING: ICD-10-CM

## 2025-01-15 DIAGNOSIS — O28.3 ABNORMAL PRENATAL ULTRASOUND: ICD-10-CM

## 2025-01-15 PROCEDURE — GENMD PR GENETICS VISIT (MEDICAID/MEDICARE)

## 2025-01-15 NOTE — PROGRESS NOTES
"Thank you for the referral of Ms. Kylee Harry. she is a 39 y.o.,  female who was 12 weeks pregnant at the time of our appointment with an EDC of 2025.  She was seen for genetic counseling with her partner due to cystic hygroma identified on early prenatal scan.    PAST HISTORY:   This is the couple's first pregnancy together.   Patient has a 17 year old healthy son with a previous partner.   Patient had a daughter with a different partner who passed away at age 16. She had hypoplastic left heart syndrome. Patient stated that she believes she may have had a genetic workup during pregnancy, and does not believe a genetic cause of the heart defect was identified. She had no other health concerns, birth defects, or learning concerns.     Ultrasounds in the current pregnancy:  NT scan on 01/10/2025:  \"Assigned GA11 w + 2 d  Assigned PANKAJ:2025  Impression  =========     Ms. Harry presents for an evaluation of first trimester anatomy. Pregnancy notable for h/o prior child with hypoplastic left heart, h/o  x2, cHTN, Obesity Class I     - In utero gestational sac with live fetus  - Crown rump length is consistent with given PANKAJ  - Fetal organ survey is within normal limits for the gestational age  - There is a cystic hygroma noted on evaluation today. It extends from the fetal head below the fetal abdomen.  - Normal appearing adnexa  - A cystic hygroma is a lymphatic abnormality recognized by a fluid filled, often septated area behind the fetal neck and extending down the fetal body. Cystic hygroma is associated with a 50% risk of fetal aneuploidy with the most common abnormalities including trisomy 21 and Campbell syndrome. Of the remaining cases, 20-30% will be associated with a fetal anomaly, most commonly being a fetal cardiac malformation. In addition, some remaining cases are associated with genetic syndromes or single gene disorders such as [place]'s syndrome. In cases with a normal karyotype, " "resolution of the hygroma at 18-20 weeks and normal anatomy US, the outcome can be favorable (at that point only a 2% risk of long term complications, morbidity or developmental delay). In addition to the above etiologies, cystic hygroma carries a high risk of  miscarriage prior to 20 weeks.   -The patient was informed of the above information and all questions were addressed.  -Genetic counseling was scheduled.  -A 16 week scan was scheduled to screen for major malformations which can be detected at that time. A second scan at 20 weeks is also recommended to complete the anatomic survey. A fetal echo is recommended to screen for cardiac anomalies and will be scheduled.\"    Prior aneuploidy screening:  Cell free DNA screening drawn on 01/10/2025; results not yet available.  Screening for Trisomy 13, Trisomy 18, Trisomy 21, and sex chromosome abnormalities.     Prior carrier screening:  Normal hemoglobin identification.  Low MCV value on CBC; 78fL (ref range ).     Patient otherwise denies complications such as bleeding or cramping, exposures, infection/illness, and travel outside of the [place] since finding out she was pregnant.    FAMILY HISTORY  Medical and family histories were reviewed and the following concerns regarding this pregnancy were apparent:  Patient:  -Maternal half brother (, 33)  of heart failure, thought to have been due to drug/alcohol abuse.  -Father (living, 60s) was diagnosed with colon cancer in his 50s or 60s.  Partner:    -Brother (living, 30s) has sickle cell trait.  -Nephew (son of maternal half brother, living, 4) has Autism. He needed open heart surgery at birth.  -Father (living, 50s/60s) has an unspecified heart concern.   The remainder of the family history was negative for birth defects, intellectual disability, recurrent pregnancy loss, or recognized inherited conditions.  Consanguinity denied.    REPORTED ETHNICITY/RACE:  Patient:   Patient's partner: "      COUNSELING:    The following information was discussed with your patient:    The general population risk of 3-5% for birth defects in every pregnancy. Cystic hygromas have been associated with an increased risk of chromosome abnormality (up to 60%), single gene disorders, as well as birth defects, particularly cardiac anomalies. The most common chromosome abnormalities associated with cystic hygroma include Campbell syndrome, Down syndrome, and trisomies 18 and 13. The most common single gene disorder associated with cystic hygroma is Richmond syndrome; although there are others, which can include skeletal dysplasias. In some cases a cystic hygroma can be associated with an infection such as parvovirus, or teratogen such as maternal alcohol use. Cystic hygroma can have poor prognosis if fluid collections progress to hydrops.  Patient has a previous child with hypoplastic left heart syndrome (HLHS). HLHS can be found in association with aneuploidy in 5-12% of cases, including trisomy 13, trisomy 18, and Campbell syndrome (Tashi et al., 2009; Sanchez et al., 2016). Copy number variants (CNVs) detected by chromosome microarray analysis (CMA) have been reported in up to 16% of cases of HLHS specifically (Delia et al., 2012) including 11q deletion (Jaylyn syndrome), 22q11.21 deletion syndrome (Sanchez et al., 2016, Yamilaa et al, 2015, Candy et al, 2004, Evelio et al,2016) and other rare CNV's. HLHS has also been seen in conjunction with several single gene disorders including, but not limited to, Rubstein-Taybi syndromei, Yanick syndrome, Brown Lemli Opitz and Woods-Ashwini syndrome (Fruitman 2000; Howard et al., 2012). It is unclear what type of genetic evaluation the affected child had before she passed away. Several teratogens, including maternal diabetes, obsesity, opioid use, and anti-fungal medications have been associated with an increased risk for HLHS (Brenda et al., 2011; Butch et  al., 2012; Kulwant et al., 2008; Galindo et al., 2015). In the absence of a chromosomal or syndromic cause, etiology appears to be multifactorial.   Chromosomes, genes, non-disjunction, and features of common aneuploidies associated with both advanced maternal age and cystic hygromas were discussed, including Trisomy 13, Trisomy 18, Trisomy 21, and sex chromosome aneuploidies. We also reviewed more rare chromosome changes, such as microdeletions and microduplications. There could additionally be a single gene condition that connects the above findings. Finally, it is possible that there is no identifiable genetic cause.   Based on the age of 39 y.o. at EDC alone, at this gestation, the risk for the fetus to have Down syndrome is approximately 1 in 71. The combined risk for the fetus to have Trisomy 21/18/13 is approximately 1 in 53. This does not take into account the above ultrasound finding, nor does it include copy number variation, mosaicism, single gene disorders, translocations, and marker chromosomes. Due to the finding of cystic hygroma, these odds are increased at least 3.79 times, giving a new risk of Down syndrome of 1 in 18.7 and risk of 21/18/13 of 1 in 14.  The availability, benefits and limitations of ultrasound study. An ultrasound study is recommended between 11-14 weeks gestation including nuchal translucency measurement, and at 18-20 weeks gestation to survey fetal organs. An ultrasound study in the second trimester can identify 50% of Down syndrome cases and 90% of trisomy 18 or trisomy 13 cases. Additional scans have been scheduled for 16 weeks and for a fetal echocardiogram.  The availability, benefits and limitations of prenatal cell-free DNA screening.  We discussed the methodology for this testing, which includes using cell-free DNA obtained from a mother's blood to screen for the presence of common chromosomal abnormalities. Depending on the laboratory used, there is a >99% sensitivity for  Down syndrome, at least 97.4% sensitivity for trisomy 18, and at least 87.5% sensitivity for trisomy 13.  Specificity for these trisomies is >99%. Although sensitivity and specificity rates are high, not all positive results are confirmed to be true positives. False negative results are rare. Therefore, in the event of an abnormal result, prenatal diagnosis through amniocentesis should be considered to confirm the findings.  Results take approximately 7-10 days, and these results are already pending as the test was ordered by the patient's provider previously.  The availability, benefits, and limitations of the MaterniT™ GENOME non-invasive prenatal test were reviewed. This test is designed to screen for any chromosome abnormality, including deletions and duplications, that are = 7 Mb in size, with at least 92.9% sensitivity and 99.9% specificity. It also includes screening for select microdeletions including 22q11 deletion (associated with DiGeorge syndrome), 15q11 deletion (associated with Prader-Willi/Angelman syndromes), 11q23 deletion (associated with Jaylyn syndrome), 8q24 deletion (associated with Mary-Giedion syndrome), 5p15 deletion (associated with Cri-du-Chat syndrome), 4p16 deletion (associated with Pérez-Hirschhorn syndrome), and 1p36 deletion (associated with 1p36 deletion syndrome). This is the most comprehensive fetal chromosomal test currently clinically available noninvasively. As with standard NIPT, false positives and false negatives are possible. As there is currently no published data regarding positive predictive value of the test, prenatal diagnosis through amniocentesis should be considered to confirm any abnormal findings.   ReadOz cell-free DNA screening, has recently become available to screen for a set of single gene conditions with high new mutation rates in the placental DNA. This screening can identify mutations in approximately 30 different genes that are associated with a high  rate of new autosomal dominant or X-linked dominant conditions. Mutations in these genes can lead to a variety of genetic diseases, including select skeletal dysplasias, craniosynstosis syndromes, and [place] spectrum syndromes. This screening is performed via next generation sequencing, and will not identify deletions or duplications. Therefore, mutation detection rates are reported to vary from as low as 46% up to 96-97%, depending on the gene. Common Indications for using this cell-free DNA screening platform include advanced paternal age; abnormal ultrasound findings, including increased nuchal translucency or nuchal fold; or a family history of one of the conditions on the screening platform. As with cell-free DNA screening for aneuploidy,false positives and false negatives are possible. In the event of an abnormal result, prenatal diagnosis through amniocentesis should be considered to confirm the findings, if confirmation is desired.    Per updated ACOG recommendations from 2017, we discussed the availability, benefits, and limitations of carrier screening for cystic fibrosis (CF), spinal muscular atrophy (SMA), and hemoglobinopathies/thalassemias. We reviewed the carrier frequencies of these conditions, varied clinical manifestations, and their autosomal recessive inheritance. If both members of the couple are found to be carriers for the same condition, there may be a 25% chance for an affected child and prenatal diagnosis would be available. Negative carrier screening does not rule out the possibility of being a carrier. [place] screening is available for CF, hemoglobinopathies, and thalassemias, and SMA.   In accordance with the most recent carrier screening guidelines from ACMG published in , we also discussed the availability of more expanded carrier screening for additional, primarily autosomal recessive, and some X-linked conditions. Current ACMG guidelines recommend all pregnant patients and  those planning a pregnancy should be offered Tier 3 carrier screening. Tier 3 carrier screening includes conditions with a carrier frequency >=1/200 in any ethnic group with reasonable representation in the [place]. Larger panels that include conditions less common than those in Tier 3 are also available, and are considered Tier 4 screening. We discussed the pros and cons of expanded carrier screening including the higher likelihood of being identified as a carrier for at least one condition on a larger panel. Approximately 4% of couples are found to be at risk to have a child with a genetic disorder based on expanded carrier screening. In rare cases, expanded carrier screening results may have health implications for the tested individual.   The availability of diagnostic fetal chromosome analysis and/or molecular gene analysis via chorionic villus sampling. The methods, benefits, limitations and risks of CVS including an approximate 1 in 200 risk of complications, including a 1 in 400 risk for miscarriage. The 0.1-1% risk of confined placental mosaicism in CVS was discussed.   The availability of diagnostic fetal chromosome analysis and/or molecular gene analysis via amniocentesis. The methods, benefits, limitations and risks of amniocentesis and a 1 in 400 risk of complications, including a 1 in 800 risk of miscarriage.  Additional family history concerns:  Multifactorial inheritance. We reviewed that conditions such as drug/alcohol abuse and late onset cancers are considered multifactorial, meaning that they are due to some combination of genetic and environmental risk factors. While we do not have specific testing to assess exact risk, we know that relatives of affected individuals have an elevated risk when compared to the general population of being similarly affected. Kylee should inform her doctors, as well as any future pediatricians, of this family history to allow for early intervention and to  maximize outcomes.   Autism. Ms. Harry's partner's nephew has Autism. Autism may occur as part of a chromosome abnormality or single gene disorder.  When isolated, it is most often thought to result from a combination of genes and environment, also referred to as multifactorial causes. Without further information it is difficult to predict risk of autism for the current pregnancy, but may be increased above that of the general population. A general genetics evaluation is recommended for any individual with autism and an appointment at [facility] can be considered for the affected relative, and can be made by calling [phone].  If an underlying genetic etiology in the family is determined, precise recurrence risks could be provided, and testing for other family members may be available if clinically indicated.       DISPOSITION:  The patient stated that she understood the above information. As standard cell free DNA screening is pending, she would like to wait for these results before proceeding with additional testing. She stated that she is potentially interested in MaterniTGenome, [facility], and carrier screening, as these are all non-invasive tests. She stated that she is not currently interested in diagnostic testing.     Patient was informed to reach out to me directly when results from standard cell free DNA screening return, as they will return to the ordering provider and not me.    Total time spent on day of encounter: 60 minutes (30  minutes with patient, 30 minutes on pre/post patient care activities, including documentation).    Thank you for allowing us to participate in the care of your patient.  Should you or your patient have any questions, please do not hesitate to contact our office at [phone].    Sincerely,   Jamaica Jones MS, [facility]  Licensed and Certified Genetic Counselor

## 2025-01-22 ENCOUNTER — TELEPHONE (OUTPATIENT)
Dept: GENETICS | Facility: CLINIC | Age: 40
End: 2025-01-22
Payer: MEDICAID

## 2025-01-22 DIAGNOSIS — O28.5 ABNORMAL GENETIC TEST DURING PREGNANCY: Primary | ICD-10-CM

## 2025-01-22 NOTE — TELEPHONE ENCOUNTER
Called Ms. Harry to review the results of her cell free DNA screening that was ordered by her OB. She was seen in genetics on 01/15/2025 due to ultrasound finding of cystic hygroma. Her standard cell free DNA screening results returned positive for Trisomy 21.        COUNSELING:    The following information was discussed with your patient:    We again discussed the methodology for cell free DNA testing in pregnancy, which analyzes placental cell-free DNA obtained from a mother's blood to screen for the presence of common chromosomal abnormalities. The laboratory that performed Ms. Harry's testing reports a 99.7% sensitivity for Down syndrome (Trisomy 21), 97.9% for trisomy 18, and 99% for trisomy 13. Specificity for these trisomies is >99%. Although sensitivity and specificity rates are high, not all positive results are confirmed to be true positives. The positive predictive value (PPV), or the chance that this is a true positive result and the fetus is affected with Down syndrome, is approximately 97.19% per the laboratory. The PPV is based on maternal age, gestational age, and test metrics alone. This does not take into account any ultrasound findings. Due to the patient's ultrasound findings, we reviewed that the PPV (chance for the fetus to have Trisomy 21) is likely much closer to 100%.    Because cell free DNA is a blood screen, it is not diagnostic and false positives can occur.  Reasons for false positives include (but are not limited to):   limitation of the technology,   confined placental mosaicism,   maternal factors,   or a vanishing twin.  The availability of diagnostic fetal chromosome analysis via amniocentesis. The methods, benefits, limitations and risks of amniocentesis and a 1 in 400 risk of complications, including a 1 in 800 risk of miscarriage.  We additionally discussed the option of genetic testing at birth. This can often be done through umbilical cord blood at birth. In this case, it is  recommended that the pregnancy is managed as if it is a true positive result. If the patient chooses to continue the pregnancy without diagnostic testing, she was made aware that the pregnancy would be managed as a true positive.  Should the pregnancy be truly affected, options include continuation with closer monitoring, adoption, and termination of pregnancy. Each state has their own laws regarding termination of pregnancy.   The patient was informed that in the state [facility] [address] prohibits anyone from purposely performing or inducing or attempting to perform or induce an  on a pregnant woman if the person has knowledge that the pregnant woman is seeking the , in whole or in part, because of any one of three reasons:  a test result indicating Down syndrome in “an unborn child”;   a prenatal diagnosis of Down syndrome in “an unborn child”;  any other reason to believe that “an unborn child” has Down syndrome.     DISPOSITION:  The patient stated that she understood the above information. She was offered an in person genetic counseling visit to review the above in greater detail. This was declined for now. She also is unsure if she would like to proceed with amniocentesis, and unsure if she would like to continue the pregnancy.     [place] Revised Code ([place]C) 3701.69 requires the [place] Department of Health to develop a Down syndrome information sheet to be available on the department’s website.  The information sheet is to be given to patients who have a test result indicating Down syndrome, or a prenatal or  diagnosis of Down syndrome.  Physicians, certified nurse-midwives, genetic counselors, hospitals, licensed maternity units,  care nurseries, maternity homes and freestanding birthing centers are required to use the information sheet:  https://odh.[place].gov/wps/wcm/connect/gov/91l76n0c-05cy-9338-r446-2p813sh3cve2/EueqUasuexvdJujtEdzix-23-.pdf?MOD=AJPERES&CONVERT_TO=url&CACHEID=[email or web address]     I will send this form to her via [employer], as well as instructions for any next steps she can take.     Sincerely,   Jamaica Jones MS, CGC  Licensed and Certified Genetic Counselor

## 2025-01-23 NOTE — TELEPHONE ENCOUNTER
Patient called to discuss options again. She was again counseled on termination laws in the state of [place].     Sincerely,   Jamaica Jones MS, CGC  Licensed and Certified Genetic Counselor

## 2025-01-24 ENCOUNTER — APPOINTMENT (OUTPATIENT)
Dept: OBSTETRICS AND GYNECOLOGY | Facility: CLINIC | Age: 40
End: 2025-01-24
Payer: MEDICAID

## 2025-02-14 ENCOUNTER — APPOINTMENT (OUTPATIENT)
Dept: RADIOLOGY | Facility: CLINIC | Age: 40
End: 2025-02-14
Payer: MEDICAID

## 2025-02-20 ENCOUNTER — APPOINTMENT (OUTPATIENT)
Dept: PEDIATRIC CARDIOLOGY | Facility: CLINIC | Age: 40
End: 2025-02-20
Payer: MEDICAID

## 2025-02-28 ENCOUNTER — APPOINTMENT (OUTPATIENT)
Dept: OBSTETRICS AND GYNECOLOGY | Facility: CLINIC | Age: 40
End: 2025-02-28
Payer: MEDICAID